# Patient Record
Sex: MALE | Race: WHITE | NOT HISPANIC OR LATINO | URBAN - METROPOLITAN AREA
[De-identification: names, ages, dates, MRNs, and addresses within clinical notes are randomized per-mention and may not be internally consistent; named-entity substitution may affect disease eponyms.]

---

## 2017-06-06 ENCOUNTER — INPATIENT (INPATIENT)
Facility: HOSPITAL | Age: 52
LOS: 2 days | Discharge: HOME | End: 2017-06-09
Attending: INTERNAL MEDICINE

## 2017-06-06 DIAGNOSIS — E03.9 HYPOTHYROIDISM, UNSPECIFIED: ICD-10-CM

## 2017-06-06 DIAGNOSIS — E78.5 HYPERLIPIDEMIA, UNSPECIFIED: ICD-10-CM

## 2017-06-06 DIAGNOSIS — D72.829 ELEVATED WHITE BLOOD CELL COUNT, UNSPECIFIED: ICD-10-CM

## 2017-06-06 DIAGNOSIS — F10.10 ALCOHOL ABUSE, UNCOMPLICATED: ICD-10-CM

## 2017-06-06 DIAGNOSIS — I10 ESSENTIAL (PRIMARY) HYPERTENSION: ICD-10-CM
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
## 2017-06-28 DIAGNOSIS — I95.9 HYPOTENSION, UNSPECIFIED: ICD-10-CM

## 2017-06-28 DIAGNOSIS — F10.10 ALCOHOL ABUSE, UNCOMPLICATED: ICD-10-CM

## 2017-06-28 DIAGNOSIS — E03.9 HYPOTHYROIDISM, UNSPECIFIED: ICD-10-CM

## 2017-06-28 DIAGNOSIS — Z88.0 ALLERGY STATUS TO PENICILLIN: ICD-10-CM

## 2017-06-28 DIAGNOSIS — E83.42 HYPOMAGNESEMIA: ICD-10-CM
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
## 2017-06-28 DIAGNOSIS — E78.5 HYPERLIPIDEMIA, UNSPECIFIED: ICD-10-CM

## 2017-06-28 DIAGNOSIS — F10.20 ALCOHOL DEPENDENCE, UNCOMPLICATED: ICD-10-CM

## 2017-06-28 DIAGNOSIS — G47.00 INSOMNIA, UNSPECIFIED: ICD-10-CM

## 2017-06-29 ENCOUNTER — TRANSCRIPTION ENCOUNTER (OUTPATIENT)
Age: 52
End: 2017-06-29

## 2017-06-30 PROBLEM — Z00.00 ENCOUNTER FOR PREVENTIVE HEALTH EXAMINATION: Status: ACTIVE | Noted: 2017-06-30

## 2017-07-03 ENCOUNTER — APPOINTMENT (OUTPATIENT)
Dept: UROLOGY | Facility: CLINIC | Age: 52
End: 2017-07-03

## 2017-07-03 VITALS
HEART RATE: 78 BPM | DIASTOLIC BLOOD PRESSURE: 75 MMHG | BODY MASS INDEX: 29.55 KG/M2 | HEIGHT: 68 IN | SYSTOLIC BLOOD PRESSURE: 113 MMHG | WEIGHT: 195 LBS

## 2017-07-03 DIAGNOSIS — Z87.442 PERSONAL HISTORY OF URINARY CALCULI: ICD-10-CM

## 2017-07-03 DIAGNOSIS — N20.0 CALCULUS OF KIDNEY: ICD-10-CM

## 2017-07-03 DIAGNOSIS — N13.8 BENIGN PROSTATIC HYPERPLASIA WITH LOWER URINARY TRACT SYMPMS: ICD-10-CM

## 2017-07-03 DIAGNOSIS — Z80.3 FAMILY HISTORY OF MALIGNANT NEOPLASM OF BREAST: ICD-10-CM

## 2017-07-03 DIAGNOSIS — Z82.0 FAMILY HISTORY OF EPILEPSY AND OTHER DISEASES OF THE NERVOUS SYSTEM: ICD-10-CM

## 2017-07-03 DIAGNOSIS — Z86.39 PERSONAL HISTORY OF OTHER ENDOCRINE, NUTRITIONAL AND METABOLIC DISEASE: ICD-10-CM

## 2017-07-03 DIAGNOSIS — Z78.9 OTHER SPECIFIED HEALTH STATUS: ICD-10-CM

## 2017-07-03 DIAGNOSIS — N40.1 BENIGN PROSTATIC HYPERPLASIA WITH LOWER URINARY TRACT SYMPMS: ICD-10-CM

## 2017-07-03 DIAGNOSIS — Z80.42 FAMILY HISTORY OF MALIGNANT NEOPLASM OF PROSTATE: ICD-10-CM

## 2017-07-03 DIAGNOSIS — Z86.79 PERSONAL HISTORY OF OTHER DISEASES OF THE CIRCULATORY SYSTEM: ICD-10-CM

## 2017-07-03 RX ORDER — TAMSULOSIN HYDROCHLORIDE 0.4 MG/1
0.4 CAPSULE ORAL
Qty: 90 | Refills: 3 | Status: ACTIVE | COMMUNITY
Start: 2017-07-03 | End: 1900-01-01

## 2017-07-03 RX ORDER — SIMVASTATIN 20 MG/1
20 TABLET, FILM COATED ORAL
Refills: 0 | Status: ACTIVE | COMMUNITY

## 2017-07-03 RX ORDER — NADOLOL 40 MG/1
40 TABLET ORAL
Refills: 0 | Status: ACTIVE | COMMUNITY

## 2017-07-03 RX ORDER — LEVOTHYROXINE SODIUM 125 UG/1
125 TABLET ORAL
Refills: 0 | Status: ACTIVE | COMMUNITY

## 2017-07-03 RX ORDER — POTASSIUM CITRATE 10 MEQ/1
10 MEQ TABLET, EXTENDED RELEASE ORAL 3 TIMES DAILY
Qty: 180 | Refills: 3 | Status: ACTIVE | COMMUNITY
Start: 2017-07-03 | End: 1900-01-01

## 2017-07-03 RX ORDER — POTASSIUM CITRATE 10 MEQ/1
10 MEQ TABLET, EXTENDED RELEASE ORAL
Refills: 0 | Status: ACTIVE | COMMUNITY

## 2017-07-05 ENCOUNTER — OUTPATIENT (OUTPATIENT)
Dept: OUTPATIENT SERVICES | Facility: HOSPITAL | Age: 52
LOS: 1 days | Discharge: HOME | End: 2017-07-05

## 2017-07-05 DIAGNOSIS — F10.10 ALCOHOL ABUSE, UNCOMPLICATED: ICD-10-CM

## 2017-07-05 DIAGNOSIS — E03.9 HYPOTHYROIDISM, UNSPECIFIED: ICD-10-CM

## 2017-07-05 DIAGNOSIS — D72.829 ELEVATED WHITE BLOOD CELL COUNT, UNSPECIFIED: ICD-10-CM

## 2017-07-05 DIAGNOSIS — E78.5 HYPERLIPIDEMIA, UNSPECIFIED: ICD-10-CM

## 2017-07-05 DIAGNOSIS — I10 ESSENTIAL (PRIMARY) HYPERTENSION: ICD-10-CM

## 2017-07-05 DIAGNOSIS — N40.1 BENIGN PROSTATIC HYPERPLASIA WITH LOWER URINARY TRACT SYMPTOMS: ICD-10-CM

## 2017-07-10 ENCOUNTER — INPATIENT (INPATIENT)
Facility: HOSPITAL | Age: 52
LOS: 2 days | Discharge: HOME | End: 2017-07-13
Attending: INTERNAL MEDICINE

## 2017-07-10 DIAGNOSIS — D72.829 ELEVATED WHITE BLOOD CELL COUNT, UNSPECIFIED: ICD-10-CM

## 2017-07-10 DIAGNOSIS — F10.10 ALCOHOL ABUSE, UNCOMPLICATED: ICD-10-CM

## 2017-07-10 DIAGNOSIS — I10 ESSENTIAL (PRIMARY) HYPERTENSION: ICD-10-CM

## 2017-07-10 DIAGNOSIS — E78.5 HYPERLIPIDEMIA, UNSPECIFIED: ICD-10-CM

## 2017-07-10 DIAGNOSIS — E03.9 HYPOTHYROIDISM, UNSPECIFIED: ICD-10-CM

## 2017-07-18 DIAGNOSIS — E03.9 HYPOTHYROIDISM, UNSPECIFIED: ICD-10-CM
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
## 2017-07-18 DIAGNOSIS — F31.9 BIPOLAR DISORDER, UNSPECIFIED: ICD-10-CM

## 2017-07-18 DIAGNOSIS — S00.81XA ABRASION OF OTHER PART OF HEAD, INITIAL ENCOUNTER: ICD-10-CM
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
## 2017-07-18 DIAGNOSIS — I10 ESSENTIAL (PRIMARY) HYPERTENSION: ICD-10-CM

## 2017-07-18 DIAGNOSIS — F10.129 ALCOHOL ABUSE WITH INTOXICATION, UNSPECIFIED: ICD-10-CM

## 2017-07-18 DIAGNOSIS — F20.9 SCHIZOPHRENIA, UNSPECIFIED: ICD-10-CM

## 2017-07-18 DIAGNOSIS — F10.10 ALCOHOL ABUSE, UNCOMPLICATED: ICD-10-CM
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
## 2017-07-18 DIAGNOSIS — G47.33 OBSTRUCTIVE SLEEP APNEA (ADULT) (PEDIATRIC): ICD-10-CM

## 2017-07-26 ENCOUNTER — APPOINTMENT (OUTPATIENT)
Dept: UROLOGY | Facility: CLINIC | Age: 52
End: 2017-07-26

## 2017-08-27 ENCOUNTER — INPATIENT (INPATIENT)
Facility: HOSPITAL | Age: 52
LOS: 1 days | Discharge: ANOTHER HOSPITAL | End: 2017-08-29
Attending: INTERNAL MEDICINE

## 2017-08-27 DIAGNOSIS — F10.10 ALCOHOL ABUSE, UNCOMPLICATED: ICD-10-CM

## 2017-08-27 DIAGNOSIS — E78.5 HYPERLIPIDEMIA, UNSPECIFIED: ICD-10-CM

## 2017-08-27 DIAGNOSIS — E03.9 HYPOTHYROIDISM, UNSPECIFIED: ICD-10-CM

## 2017-08-27 DIAGNOSIS — I10 ESSENTIAL (PRIMARY) HYPERTENSION: ICD-10-CM

## 2017-08-27 DIAGNOSIS — D72.829 ELEVATED WHITE BLOOD CELL COUNT, UNSPECIFIED: ICD-10-CM

## 2017-08-29 ENCOUNTER — INPATIENT (INPATIENT)
Facility: HOSPITAL | Age: 52
LOS: 2 days | Discharge: HOME | End: 2017-09-01
Attending: INTERNAL MEDICINE

## 2017-08-29 DIAGNOSIS — D72.829 ELEVATED WHITE BLOOD CELL COUNT, UNSPECIFIED: ICD-10-CM

## 2017-08-29 DIAGNOSIS — I10 ESSENTIAL (PRIMARY) HYPERTENSION: ICD-10-CM

## 2017-08-29 DIAGNOSIS — F10.10 ALCOHOL ABUSE, UNCOMPLICATED: ICD-10-CM

## 2017-08-29 DIAGNOSIS — E03.9 HYPOTHYROIDISM, UNSPECIFIED: ICD-10-CM

## 2017-08-29 DIAGNOSIS — E78.5 HYPERLIPIDEMIA, UNSPECIFIED: ICD-10-CM

## 2017-09-02 DIAGNOSIS — E78.00 PURE HYPERCHOLESTEROLEMIA, UNSPECIFIED: ICD-10-CM

## 2017-09-02 DIAGNOSIS — F10.20 ALCOHOL DEPENDENCE, UNCOMPLICATED: ICD-10-CM

## 2017-09-02 DIAGNOSIS — F10.231 ALCOHOL DEPENDENCE WITH WITHDRAWAL DELIRIUM: ICD-10-CM

## 2017-09-02 DIAGNOSIS — F31.9 BIPOLAR DISORDER, UNSPECIFIED: ICD-10-CM

## 2017-09-02 DIAGNOSIS — F14.10 COCAINE ABUSE, UNCOMPLICATED: ICD-10-CM

## 2017-09-02 DIAGNOSIS — Z88.8 ALLERGY STATUS TO OTHER DRUGS, MEDICAMENTS AND BIOLOGICAL SUBSTANCES: ICD-10-CM

## 2017-09-02 DIAGNOSIS — E03.9 HYPOTHYROIDISM, UNSPECIFIED: ICD-10-CM

## 2017-09-07 DIAGNOSIS — F10.231 ALCOHOL DEPENDENCE WITH WITHDRAWAL DELIRIUM: ICD-10-CM

## 2017-09-07 DIAGNOSIS — F20.9 SCHIZOPHRENIA, UNSPECIFIED: ICD-10-CM

## 2017-09-07 DIAGNOSIS — F10.251 ALCOHOL DEPENDENCE WITH ALCOHOL-INDUCED PSYCHOTIC DISORDER WITH HALLUCINATIONS: ICD-10-CM

## 2017-09-07 DIAGNOSIS — Z78.1 PHYSICAL RESTRAINT STATUS: ICD-10-CM

## 2017-09-07 DIAGNOSIS — D72.829 ELEVATED WHITE BLOOD CELL COUNT, UNSPECIFIED: ICD-10-CM

## 2017-09-07 DIAGNOSIS — R74.0 NONSPECIFIC ELEVATION OF LEVELS OF TRANSAMINASE AND LACTIC ACID DEHYDROGENASE [LDH]: ICD-10-CM

## 2017-09-07 DIAGNOSIS — E83.42 HYPOMAGNESEMIA: ICD-10-CM

## 2017-09-07 DIAGNOSIS — E78.5 HYPERLIPIDEMIA, UNSPECIFIED: ICD-10-CM

## 2017-09-07 DIAGNOSIS — F31.9 BIPOLAR DISORDER, UNSPECIFIED: ICD-10-CM

## 2017-09-07 DIAGNOSIS — E87.6 HYPOKALEMIA: ICD-10-CM

## 2017-09-07 DIAGNOSIS — G47.33 OBSTRUCTIVE SLEEP APNEA (ADULT) (PEDIATRIC): ICD-10-CM

## 2017-09-07 DIAGNOSIS — E03.9 HYPOTHYROIDISM, UNSPECIFIED: ICD-10-CM

## 2017-09-07 DIAGNOSIS — N40.0 BENIGN PROSTATIC HYPERPLASIA WITHOUT LOWER URINARY TRACT SYMPTOMS: ICD-10-CM

## 2017-09-07 DIAGNOSIS — R45.1 RESTLESSNESS AND AGITATION: ICD-10-CM

## 2017-09-07 DIAGNOSIS — I10 ESSENTIAL (PRIMARY) HYPERTENSION: ICD-10-CM

## 2017-09-21 ENCOUNTER — INPATIENT (INPATIENT)
Facility: HOSPITAL | Age: 52
LOS: 1 days | Discharge: HOME | End: 2017-09-23
Attending: INTERNAL MEDICINE | Admitting: INTERNAL MEDICINE

## 2017-09-21 DIAGNOSIS — D72.829 ELEVATED WHITE BLOOD CELL COUNT, UNSPECIFIED: ICD-10-CM

## 2017-09-21 DIAGNOSIS — I10 ESSENTIAL (PRIMARY) HYPERTENSION: ICD-10-CM

## 2017-09-21 DIAGNOSIS — E03.9 HYPOTHYROIDISM, UNSPECIFIED: ICD-10-CM

## 2017-09-21 DIAGNOSIS — E78.5 HYPERLIPIDEMIA, UNSPECIFIED: ICD-10-CM

## 2017-09-21 DIAGNOSIS — F10.10 ALCOHOL ABUSE, UNCOMPLICATED: ICD-10-CM

## 2017-09-27 DIAGNOSIS — F31.9 BIPOLAR DISORDER, UNSPECIFIED: ICD-10-CM

## 2017-09-27 DIAGNOSIS — E87.2 ACIDOSIS: ICD-10-CM

## 2017-09-27 DIAGNOSIS — F20.9 SCHIZOPHRENIA, UNSPECIFIED: ICD-10-CM

## 2017-09-27 DIAGNOSIS — R10.9 UNSPECIFIED ABDOMINAL PAIN: ICD-10-CM

## 2017-09-27 DIAGNOSIS — E03.9 HYPOTHYROIDISM, UNSPECIFIED: ICD-10-CM

## 2017-09-27 DIAGNOSIS — I10 ESSENTIAL (PRIMARY) HYPERTENSION: ICD-10-CM

## 2017-09-27 DIAGNOSIS — R74.0 NONSPECIFIC ELEVATION OF LEVELS OF TRANSAMINASE AND LACTIC ACID DEHYDROGENASE [LDH]: ICD-10-CM

## 2017-09-27 DIAGNOSIS — E78.5 HYPERLIPIDEMIA, UNSPECIFIED: ICD-10-CM

## 2017-09-27 DIAGNOSIS — F10.129 ALCOHOL ABUSE WITH INTOXICATION, UNSPECIFIED: ICD-10-CM

## 2017-09-27 DIAGNOSIS — G40.909 EPILEPSY, UNSPECIFIED, NOT INTRACTABLE, WITHOUT STATUS EPILEPTICUS: ICD-10-CM

## 2017-12-22 ENCOUNTER — TRANSCRIPTION ENCOUNTER (OUTPATIENT)
Age: 52
End: 2017-12-22

## 2018-06-18 ENCOUNTER — EMERGENCY (EMERGENCY)
Facility: HOSPITAL | Age: 53
LOS: 0 days | Discharge: HOME | End: 2018-06-19
Attending: EMERGENCY MEDICINE | Admitting: EMERGENCY MEDICINE

## 2018-06-18 VITALS
RESPIRATION RATE: 18 BRPM | SYSTOLIC BLOOD PRESSURE: 161 MMHG | HEART RATE: 110 BPM | WEIGHT: 214.95 LBS | HEIGHT: 69 IN | OXYGEN SATURATION: 97 % | TEMPERATURE: 96 F | DIASTOLIC BLOOD PRESSURE: 89 MMHG

## 2018-06-18 DIAGNOSIS — F10.10 ALCOHOL ABUSE, UNCOMPLICATED: ICD-10-CM

## 2018-06-18 NOTE — ED ADULT NURSE NOTE - NSELOPED_ED_ALL_ED
Patient's chart was referred to charge nurse/supervisor for disposition of prescription and/or discharge instructions./Physician notified/Patient and/or family announced that they are leaving. They were advised to stay, advised to return if worse.

## 2018-06-19 NOTE — ED PROVIDER NOTE - PHYSICAL EXAMINATION
Physical Exam    Vital Signs: I have reviewed the initial vital signs.  Constitutional: well-nourished, appears stated age, no acute distress + AOB  Eyes: Conjunctiva pink, Sclera clear, PERRLA, EOMI.  Cardiovascular: S1 and S2, regular rate, regular rhythm, well-perfused extremities, radial pulses equal and 2+  Respiratory: unlabored respiratory effort, clear to auscultation bilaterally no wheezing, rales and rhonchi  Gastrointestinal: soft, non-tender abdomen, no pulsatile mass, normal bowl sounds  Musculoskeletal: supple neck, no lower extremity edema, no midline tenderness  Integumentary: warm, dry, no rash  Neurologic: awake, alert, cranial nerves II-XII grossly intact, extremities’ motor and sensory functions grossly intact  Psychiatric: appropriate mood, appropriate affect

## 2018-06-19 NOTE — ED PROVIDER NOTE - OBJECTIVE STATEMENT
Pt presents today for ETOH. Pt history of prior detox. pt has no current complaints. Pt requesting food and blanket. Pt denies chest pain, shortness of breath, headache, dizziness, nausea/vomiting/diarrhea, head injury, recent trauma. Denies current thoughts of Suicidal and Homicidal Ideations.

## 2018-07-22 ENCOUNTER — TRANSCRIPTION ENCOUNTER (OUTPATIENT)
Age: 53
End: 2018-07-22

## 2020-06-02 NOTE — ED ADULT TRIAGE NOTE - PRO INTERPRETER NEED 2
+patch of cauterized mucosa visible without any superficial vessels seen in right nare, no active bleeding at this time. Mouth with normal mucosa  Throat has no vesicles, no oropharyngeal exudates and uvula is midline. English

## 2020-11-16 ENCOUNTER — INPATIENT (INPATIENT)
Facility: HOSPITAL | Age: 55
LOS: 1 days | Discharge: HOME | End: 2020-11-18
Attending: HOSPITALIST | Admitting: HOSPITALIST
Payer: MEDICAID

## 2020-11-16 VITALS
RESPIRATION RATE: 18 BRPM | HEART RATE: 78 BPM | TEMPERATURE: 98 F | DIASTOLIC BLOOD PRESSURE: 92 MMHG | HEIGHT: 69 IN | OXYGEN SATURATION: 98 % | SYSTOLIC BLOOD PRESSURE: 172 MMHG

## 2020-11-16 PROBLEM — F19.90 OTHER PSYCHOACTIVE SUBSTANCE USE, UNSPECIFIED, UNCOMPLICATED: Chronic | Status: ACTIVE | Noted: 2018-06-18

## 2020-11-16 PROBLEM — F10.10 ALCOHOL ABUSE, UNCOMPLICATED: Chronic | Status: ACTIVE | Noted: 2018-06-18

## 2020-11-16 LAB
ALBUMIN SERPL ELPH-MCNC: 4.4 G/DL — SIGNIFICANT CHANGE UP (ref 3.5–5.2)
ALP SERPL-CCNC: 87 U/L — SIGNIFICANT CHANGE UP (ref 30–115)
ALT FLD-CCNC: 40 U/L — SIGNIFICANT CHANGE UP (ref 0–41)
ANION GAP SERPL CALC-SCNC: 10 MMOL/L — SIGNIFICANT CHANGE UP (ref 7–14)
AST SERPL-CCNC: 29 U/L — SIGNIFICANT CHANGE UP (ref 0–41)
BASOPHILS # BLD AUTO: 0.03 K/UL — SIGNIFICANT CHANGE UP (ref 0–0.2)
BASOPHILS NFR BLD AUTO: 0.3 % — SIGNIFICANT CHANGE UP (ref 0–1)
BILIRUB SERPL-MCNC: 0.6 MG/DL — SIGNIFICANT CHANGE UP (ref 0.2–1.2)
BUN SERPL-MCNC: 14 MG/DL — SIGNIFICANT CHANGE UP (ref 10–20)
CALCIUM SERPL-MCNC: 9.7 MG/DL — SIGNIFICANT CHANGE UP (ref 8.5–10.1)
CHLORIDE SERPL-SCNC: 100 MMOL/L — SIGNIFICANT CHANGE UP (ref 98–110)
CO2 SERPL-SCNC: 27 MMOL/L — SIGNIFICANT CHANGE UP (ref 17–32)
CREAT SERPL-MCNC: 0.8 MG/DL — SIGNIFICANT CHANGE UP (ref 0.7–1.5)
EOSINOPHIL # BLD AUTO: 0.07 K/UL — SIGNIFICANT CHANGE UP (ref 0–0.7)
EOSINOPHIL NFR BLD AUTO: 0.6 % — SIGNIFICANT CHANGE UP (ref 0–8)
GLUCOSE SERPL-MCNC: 99 MG/DL — SIGNIFICANT CHANGE UP (ref 70–99)
HCT VFR BLD CALC: 43 % — SIGNIFICANT CHANGE UP (ref 42–52)
HGB BLD-MCNC: 14.1 G/DL — SIGNIFICANT CHANGE UP (ref 14–18)
IMM GRANULOCYTES NFR BLD AUTO: 0.3 % — SIGNIFICANT CHANGE UP (ref 0.1–0.3)
LACTATE SERPL-SCNC: 1.1 MMOL/L — SIGNIFICANT CHANGE UP (ref 0.7–2)
LYMPHOCYTES # BLD AUTO: 1.11 K/UL — LOW (ref 1.2–3.4)
LYMPHOCYTES # BLD AUTO: 9.7 % — LOW (ref 20.5–51.1)
MCHC RBC-ENTMCNC: 27.9 PG — SIGNIFICANT CHANGE UP (ref 27–31)
MCHC RBC-ENTMCNC: 32.8 G/DL — SIGNIFICANT CHANGE UP (ref 32–37)
MCV RBC AUTO: 85.1 FL — SIGNIFICANT CHANGE UP (ref 80–94)
MONOCYTES # BLD AUTO: 0.83 K/UL — HIGH (ref 0.1–0.6)
MONOCYTES NFR BLD AUTO: 7.2 % — SIGNIFICANT CHANGE UP (ref 1.7–9.3)
NEUTROPHILS # BLD AUTO: 9.41 K/UL — HIGH (ref 1.4–6.5)
NEUTROPHILS NFR BLD AUTO: 81.9 % — HIGH (ref 42.2–75.2)
NRBC # BLD: 0 /100 WBCS — SIGNIFICANT CHANGE UP (ref 0–0)
PLATELET # BLD AUTO: 128 K/UL — LOW (ref 130–400)
POTASSIUM SERPL-MCNC: 4.2 MMOL/L — SIGNIFICANT CHANGE UP (ref 3.5–5)
POTASSIUM SERPL-SCNC: 4.2 MMOL/L — SIGNIFICANT CHANGE UP (ref 3.5–5)
PROT SERPL-MCNC: 7 G/DL — SIGNIFICANT CHANGE UP (ref 6–8)
RBC # BLD: 5.05 M/UL — SIGNIFICANT CHANGE UP (ref 4.7–6.1)
RBC # FLD: 13.5 % — SIGNIFICANT CHANGE UP (ref 11.5–14.5)
SARS-COV-2 RNA SPEC QL NAA+PROBE: SIGNIFICANT CHANGE UP
SODIUM SERPL-SCNC: 137 MMOL/L — SIGNIFICANT CHANGE UP (ref 135–146)
WBC # BLD: 11.49 K/UL — HIGH (ref 4.8–10.8)
WBC # FLD AUTO: 11.49 K/UL — HIGH (ref 4.8–10.8)

## 2020-11-16 PROCEDURE — 99285 EMERGENCY DEPT VISIT HI MDM: CPT

## 2020-11-16 PROCEDURE — 73090 X-RAY EXAM OF FOREARM: CPT | Mod: 26,RT

## 2020-11-16 PROCEDURE — 99223 1ST HOSP IP/OBS HIGH 75: CPT | Mod: AI

## 2020-11-16 PROCEDURE — 93970 EXTREMITY STUDY: CPT | Mod: 26

## 2020-11-16 PROCEDURE — 73130 X-RAY EXAM OF HAND: CPT | Mod: 26,RT

## 2020-11-16 RX ORDER — VANCOMYCIN HCL 1 G
1000 VIAL (EA) INTRAVENOUS ONCE
Refills: 0 | Status: COMPLETED | OUTPATIENT
Start: 2020-11-16 | End: 2020-11-16

## 2020-11-16 RX ORDER — VANCOMYCIN HCL 1 G
1000 VIAL (EA) INTRAVENOUS EVERY 12 HOURS
Refills: 0 | Status: DISCONTINUED | OUTPATIENT
Start: 2020-11-16 | End: 2020-11-17

## 2020-11-16 RX ORDER — PANTOPRAZOLE SODIUM 20 MG/1
40 TABLET, DELAYED RELEASE ORAL
Refills: 0 | Status: DISCONTINUED | OUTPATIENT
Start: 2020-11-16 | End: 2020-11-18

## 2020-11-16 RX ORDER — SODIUM CHLORIDE 9 MG/ML
1000 INJECTION, SOLUTION INTRAVENOUS ONCE
Refills: 0 | Status: COMPLETED | OUTPATIENT
Start: 2020-11-16 | End: 2020-11-16

## 2020-11-16 RX ORDER — HEPARIN SODIUM 5000 [USP'U]/ML
5000 INJECTION INTRAVENOUS; SUBCUTANEOUS EVERY 8 HOURS
Refills: 0 | Status: DISCONTINUED | OUTPATIENT
Start: 2020-11-16 | End: 2020-11-18

## 2020-11-16 RX ADMIN — Medication 250 MILLIGRAM(S): at 19:05

## 2020-11-16 RX ADMIN — SODIUM CHLORIDE 1000 MILLILITER(S): 9 INJECTION, SOLUTION INTRAVENOUS at 18:46

## 2020-11-16 NOTE — H&P ADULT - NSHPLABSRESULTS_GEN_ALL_CORE
Labs:                         14.1   11.49 )-----------( 128      ( 16 Nov 2020 17:15 )             43.0     Neutophil% 81.9, Lymphocyte% 9.7, Monocyte% 7.2, Bands% 0.3 11-16-20 @ 17:15    16 Nov 2020 17:15    137    |  100    |  14     ----------------------------<  99     4.2     |  27     |  0.8      Ca    9.7        16 Nov 2020 17:15    TPro  7.0    /  Alb  4.4    /  TBili  0.6    /  DBili  x      /  AST  29     /  ALT  40     /  AlkPhos  87     16 Nov 2020 17:15                                            Radiology:

## 2020-11-16 NOTE — H&P ADULT - ASSESSMENT
53 yo M w/ PMHx of HTN, HLD, Hypothyroidism, BEKAH, IVDU and EtOH Abuse (takes no meds due to lack of insurance) who came to the ED for Pain, redness and swelling of his Rt Hand and forearm x 4 days. Admitted to medicine for cellulitis and possible compartment syndrome.    #Non-purulent cellulitis in setting of IVDU  -Shoots meth primarily in affected extremity  -RUE obviously swollen w/ overlying erythema and tenderness  -Duplex negative per tech  -Exam findings concerning for possible compartment syndrome  Plan  - f/u official Duplex and Xray read  - f/u vascular surgery  - c/w Vanco to cover MRSA  - Blood Cx, Procal  - ID consult    #Hx of EtOH use  -SBIRT  -CIWA    #HTN  -Elevated on admission  -Does not take any meds  Plan  - Trend BP  - Consider adding Lisinopril if remains elevated    #HLD  -Lipid profile  -Statin if elevated    #Hypothyroidism  -f/u Thyroid panel     DVT ppx Heparin Subcu  GI ppx PPI  Diet DASH  Code Full  Dispo Acute  Pending ID and Vascular eval, Abx

## 2020-11-16 NOTE — ED ADULT NURSE NOTE - NSSUHOSCREENINGYN_ED_ALL_ED
Burn Cardiology Critical Care ENT Electrophysiology Infectious Disease Intervent Radiology Neurology Nutrition Support Physiatry Pulmonology Surgery Gastroenterology Palliative Care Yes - the patient is able to be screened

## 2020-11-16 NOTE — ED PROVIDER NOTE - PROGRESS NOTE DETAILS
PALEVY: prelim venous duplex per vascular tech negative for DVT. there is +fb in subcutaneous tissue of one of digits, no open wounds, this is likely old. PALEVY: prelim venous duplex per vascular tech negative for DVT. on XR read by me there is +fb in subcutaneous tissue of one of digits, no open wounds, this is likely old.

## 2020-11-16 NOTE — H&P ADULT - NSHPPHYSICALEXAM_GEN_ALL_CORE
CONSTITUTIONAL: No acute distress, well-developed, well-groomed, AAOx3  HEAD: Atraumatic, normocephalic  EYES: EOM intact, PERRLA, conjunctiva and sclera clear  ENT: Supple, no masses, no thyromegaly, no bruits, no JVD; moist mucous membranes  PULMONARY: Clear to auscultation bilaterally; no wheezes, rales, or rhonchi  CARDIOVASCULAR: Regular rate and rhythm; no murmurs, rubs, or gallops  GASTROINTESTINAL: Soft, non-tender, non-distended; bowel sounds present  MUSCULOSKELETAL: Rt Arm and Forearm edematous, erythematous and painful, dec capillary refill  NEUROLOGY: Paresthesia in fingers  SKIN: Track marks b/l

## 2020-11-16 NOTE — ED PROVIDER NOTE - PHYSICAL EXAMINATION
PHYSICAL EXAM:    GENERAL: Alert, appears stated age, well appearing, non-toxic  SKIN: Warm, pink and dry. MMM. see MSK.   HEAD: NC  EYE: Normal lids/conjunctiva  ENT: Normal hearing, patent oropharynx  NECK: +supple. No meningismus, or JVD  Pulm: Bilateral BS, normal resp effort, no wheezes, stridor, or retractions  CV: RRR, no M/R/G, 2+and = radial pulses  Abd: soft, non-tender, non-distended  Mskel: R hand edema into R forearm. +ecchymosis at R wrist with streaking up to R AC. no pus. no focal collection. no open wounds.   Neuro: AAOx3, no sensory/motor deficits. 5/5 strength throughout. normal gait.

## 2020-11-16 NOTE — ED PROVIDER NOTE - CLINICAL SUMMARY MEDICAL DECISION MAKING FREE TEXT BOX
evaluated for cellulitis of the right forearm with history of IVDU and recent injection. will reqiure iv abx given IVD injection.. patient evaluated

## 2020-11-16 NOTE — H&P ADULT - NSHPSOCIALHISTORY_GEN_ALL_CORE
IVDU > Last used IV meth 1 week ago and took oral meth this morning  EtOH> says he has cut down to social drinking and had 1 drink 2 days ago.

## 2020-11-16 NOTE — ED ADULT NURSE NOTE - OBJECTIVE STATEMENT
patient present to ED c/o right arm pain and swelling since 11/2/2020, patient unsure of cause. patient has bruising noted to left medial wrist.

## 2020-11-16 NOTE — H&P ADULT - HISTORY OF PRESENT ILLNESS
The patient is an 55 yo M w/ PMHx of HTN, HLD, Hypothyroidism, BEKAH, IVDU and EtOH Abuse (takes no meds due to lack of insurance) who came to the ED for Pain, redness and swelling of his Rt Hand and forearm x 4 days. Per patient, he usually uses his Rt arm for IVDU, however last used his Lt arm 1 weeks ago. The patient has been having worsening pain, erythema and edema of his Rt arm and forearm 9/10 in intensity, which prompted him to come to the ED.     ED course: Patient HTN on initial encounter. Labs significant for leukocytosis. Duplex negative for DVT on tech read. Xray showing no obvious abnl. Physical Exam concerning for reduced capillary refill and paresthesia, thus vascular consult placed for possible compartment syndrome.

## 2020-11-16 NOTE — H&P ADULT - ATTENDING COMMENTS
55 YO M with a PMH of HTN, HLD, Hypothyroidism, BEKAH, IVDU (meth), and EtOH Abuse who presents to the hospital with a c/o right wrist redness/swelling for the past x 4 days. Associated with pain. Denies any trauma/falls. Denies any fevers/chills, paresthesias, discoloration, CP, palpitations, or LE swelling. Of note, the pt last "shot up" in the RUE ~3 days ago. In the ED, XRs of RUE and doppler are negative (pending official read). Cultures drawn and pt started on IV ABXs (Vanco) and IVFs (LR).     Physical exam shows pt in NAD. VSS, afebrile, not hypoxic on RA. A&Ox3. Non-focal neuro exam. Muscle strength/sensation intact. CTA B/L with no W/C/R. RRR, no M/G/R. ABD is soft and non-tender, normoactive BSs. Right hand/wrist with circumferential swelling noted from distal aspect and ascending to mid forearm; + TTP, difficult to palpate pulses, there is a 2x2cm area of erythema over the lateral aspect, no drainage; no pain in the wrist/elbow the active/passive ROM, no pain with short arc motion; track marks noted in right antecubital fossa. Labs and radiology as above.     Right wrist and forearm non-purulent cellulitis in setting of active IVDA, rule out compartment syndrome; no sepsis on admission. Send ESR/CRP. A1c and lipids. FU blood cultures. IV ABXs (Vanco). FU official XR results. ID consult. FU official venous doppler. Vascular consulted.     HX of HTN, HLD, Hypothyroidism, BEKAH, IVDU (meth), and EtOH Abuse. Restart home meds. DVT PPX. Inform PCP of pt's admission to hospital. My note supersedes the residents note.

## 2020-11-16 NOTE — ED PROVIDER NOTE - OBJECTIVE STATEMENT
55 y/o M with PMH HTN, HLD, hypothyroidism, BEKAH, IVDA (last heroin 1 wk ago), cocaine use, daily ETOH use, presents with R hand and forearm constant mild throbbing pressure-like pain x 4 days. +radiates up hand to arm. +swelling. +redness streaking up arm.   +worse with movement/palpation, better with rest.   +this is near an injection site used previously, no known fb.   no fever/n/v.  PCP Scafuri

## 2020-11-16 NOTE — ED PROVIDER NOTE - NS ED ROS FT
Review of Systems    Constitutional: (-) fever   Cardiovascular: (-) chest pain, (-) syncope (-) palpitations  Respiratory: (-) cough, (-) shortness of breath  Gastrointestinal: (-) vomiting, (-) diarrhea  (-) abdominal pain  Genitourinary:  (-) dysuria   Musculoskeletal: (-) neck pain, (-) back pain, (-) leg pain  Integumentary: see hpi   Neurological: (-) headache, (-) confusion  Hematologic: (-) easy bruising   Psychiatric: (-) hallucinations  Allergic/Immunologic: (-) pruritus

## 2020-11-16 NOTE — ED PROVIDER NOTE - ATTENDING CONTRIBUTION TO CARE
right forearm pain after injection of meth with history of IVDU, no fevers, exam shows soft compartment of forearm, no pain with passive extension, cap refill nml, pulse nml and sensation nml, there is swelling to right forearm with warmth and mild erythema. Will treat for cellulitis, admit for IV abx.

## 2020-11-16 NOTE — ED ADULT NURSE NOTE - NS ED NURSE REPORT GIVEN DT
Quality 474: Zoster Vaccination Status: Shingrix vaccination previously received Quality 131: Pain Assessment And Follow-Up: Pain assessment using a standardized tool is documented as negative, no follow-up plan required Detail Level: Detailed Quality 265: Biopsy Follow-Up: Biopsy results reviewed, communicated, tracked, and documented Quality 130: Documentation Of Current Medications In The Medical Record: Current Medications Documented 16-Nov-2020 21:34

## 2020-11-17 LAB
A1C WITH ESTIMATED AVERAGE GLUCOSE RESULT: 6 % — HIGH (ref 4–5.6)
ANION GAP SERPL CALC-SCNC: 11 MMOL/L — SIGNIFICANT CHANGE UP (ref 7–14)
BUN SERPL-MCNC: 10 MG/DL — SIGNIFICANT CHANGE UP (ref 10–20)
CALCIUM SERPL-MCNC: 8.8 MG/DL — SIGNIFICANT CHANGE UP (ref 8.5–10.1)
CHLORIDE SERPL-SCNC: 100 MMOL/L — SIGNIFICANT CHANGE UP (ref 98–110)
CHOLEST SERPL-MCNC: 170 MG/DL — SIGNIFICANT CHANGE UP
CK SERPL-CCNC: 121 U/L — SIGNIFICANT CHANGE UP (ref 0–225)
CO2 SERPL-SCNC: 24 MMOL/L — SIGNIFICANT CHANGE UP (ref 17–32)
CREAT SERPL-MCNC: 0.8 MG/DL — SIGNIFICANT CHANGE UP (ref 0.7–1.5)
ESTIMATED AVERAGE GLUCOSE: 126 MG/DL — HIGH (ref 68–114)
GLUCOSE SERPL-MCNC: 146 MG/DL — HIGH (ref 70–99)
HCT VFR BLD CALC: 43.8 % — SIGNIFICANT CHANGE UP (ref 42–52)
HCV AB S/CO SERPL IA: 0.04 COI — SIGNIFICANT CHANGE UP
HCV AB SERPL-IMP: SIGNIFICANT CHANGE UP
HDLC SERPL-MCNC: 47 MG/DL — SIGNIFICANT CHANGE UP
HGB BLD-MCNC: 14.1 G/DL — SIGNIFICANT CHANGE UP (ref 14–18)
LACTATE SERPL-SCNC: 1.1 MMOL/L — SIGNIFICANT CHANGE UP (ref 0.7–2)
LIPID PNL WITH DIRECT LDL SERPL: 107 MG/DL — HIGH
MCHC RBC-ENTMCNC: 27.9 PG — SIGNIFICANT CHANGE UP (ref 27–31)
MCHC RBC-ENTMCNC: 32.2 G/DL — SIGNIFICANT CHANGE UP (ref 32–37)
MCV RBC AUTO: 86.6 FL — SIGNIFICANT CHANGE UP (ref 80–94)
MRSA PCR RESULT.: NEGATIVE — SIGNIFICANT CHANGE UP
NON HDL CHOLESTEROL: 123 MG/DL — SIGNIFICANT CHANGE UP
NRBC # BLD: 0 /100 WBCS — SIGNIFICANT CHANGE UP (ref 0–0)
PLATELET # BLD AUTO: 116 K/UL — LOW (ref 130–400)
POTASSIUM SERPL-MCNC: 3.7 MMOL/L — SIGNIFICANT CHANGE UP (ref 3.5–5)
POTASSIUM SERPL-SCNC: 3.7 MMOL/L — SIGNIFICANT CHANGE UP (ref 3.5–5)
PROCALCITONIN SERPL-MCNC: 0.18 NG/ML — HIGH (ref 0.02–0.1)
RBC # BLD: 5.06 M/UL — SIGNIFICANT CHANGE UP (ref 4.7–6.1)
RBC # FLD: 13.6 % — SIGNIFICANT CHANGE UP (ref 11.5–14.5)
SODIUM SERPL-SCNC: 135 MMOL/L — SIGNIFICANT CHANGE UP (ref 135–146)
TRIGL SERPL-MCNC: 116 MG/DL — SIGNIFICANT CHANGE UP
WBC # BLD: 10.69 K/UL — SIGNIFICANT CHANGE UP (ref 4.8–10.8)
WBC # FLD AUTO: 10.69 K/UL — SIGNIFICANT CHANGE UP (ref 4.8–10.8)

## 2020-11-17 PROCEDURE — 93010 ELECTROCARDIOGRAM REPORT: CPT

## 2020-11-17 PROCEDURE — 99221 1ST HOSP IP/OBS SF/LOW 40: CPT

## 2020-11-17 PROCEDURE — 99233 SBSQ HOSP IP/OBS HIGH 50: CPT

## 2020-11-17 RX ORDER — AMPICILLIN SODIUM AND SULBACTAM SODIUM 250; 125 MG/ML; MG/ML
INJECTION, POWDER, FOR SUSPENSION INTRAMUSCULAR; INTRAVENOUS
Refills: 0 | Status: DISCONTINUED | OUTPATIENT
Start: 2020-11-17 | End: 2020-11-17

## 2020-11-17 RX ORDER — LACTOBACILLUS ACIDOPHILUS 100MM CELL
1 CAPSULE ORAL DAILY
Refills: 0 | Status: DISCONTINUED | OUTPATIENT
Start: 2020-11-17 | End: 2020-11-18

## 2020-11-17 RX ORDER — CHLORHEXIDINE GLUCONATE 213 G/1000ML
1 SOLUTION TOPICAL
Refills: 0 | Status: DISCONTINUED | OUTPATIENT
Start: 2020-11-17 | End: 2020-11-18

## 2020-11-17 RX ORDER — FOLIC ACID 0.8 MG
1 TABLET ORAL DAILY
Refills: 0 | Status: DISCONTINUED | OUTPATIENT
Start: 2020-11-17 | End: 2020-11-18

## 2020-11-17 RX ORDER — AMLODIPINE BESYLATE 2.5 MG/1
5 TABLET ORAL DAILY
Refills: 0 | Status: DISCONTINUED | OUTPATIENT
Start: 2020-11-17 | End: 2020-11-18

## 2020-11-17 RX ORDER — AMPICILLIN SODIUM AND SULBACTAM SODIUM 250; 125 MG/ML; MG/ML
1.5 INJECTION, POWDER, FOR SUSPENSION INTRAMUSCULAR; INTRAVENOUS EVERY 6 HOURS
Refills: 0 | Status: DISCONTINUED | OUTPATIENT
Start: 2020-11-17 | End: 2020-11-17

## 2020-11-17 RX ORDER — AMPICILLIN SODIUM AND SULBACTAM SODIUM 250; 125 MG/ML; MG/ML
1.5 INJECTION, POWDER, FOR SUSPENSION INTRAMUSCULAR; INTRAVENOUS ONCE
Refills: 0 | Status: COMPLETED | OUTPATIENT
Start: 2020-11-17 | End: 2020-11-17

## 2020-11-17 RX ORDER — CEFTRIAXONE 500 MG/1
2000 INJECTION, POWDER, FOR SOLUTION INTRAMUSCULAR; INTRAVENOUS EVERY 24 HOURS
Refills: 0 | Status: DISCONTINUED | OUTPATIENT
Start: 2020-11-17 | End: 2020-11-18

## 2020-11-17 RX ORDER — THIAMINE MONONITRATE (VIT B1) 100 MG
100 TABLET ORAL DAILY
Refills: 0 | Status: DISCONTINUED | OUTPATIENT
Start: 2020-11-17 | End: 2020-11-18

## 2020-11-17 RX ADMIN — Medication 100 MILLIGRAM(S): at 14:54

## 2020-11-17 RX ADMIN — Medication 100 MILLIGRAM(S): at 21:21

## 2020-11-17 RX ADMIN — HEPARIN SODIUM 5000 UNIT(S): 5000 INJECTION INTRAVENOUS; SUBCUTANEOUS at 14:48

## 2020-11-17 RX ADMIN — Medication 250 MILLIGRAM(S): at 06:02

## 2020-11-17 RX ADMIN — CEFTRIAXONE 100 MILLIGRAM(S): 500 INJECTION, POWDER, FOR SOLUTION INTRAMUSCULAR; INTRAVENOUS at 14:48

## 2020-11-17 RX ADMIN — AMPICILLIN SODIUM AND SULBACTAM SODIUM 100 GRAM(S): 250; 125 INJECTION, POWDER, FOR SUSPENSION INTRAMUSCULAR; INTRAVENOUS at 11:37

## 2020-11-17 RX ADMIN — HEPARIN SODIUM 5000 UNIT(S): 5000 INJECTION INTRAVENOUS; SUBCUTANEOUS at 21:20

## 2020-11-17 RX ADMIN — Medication 100 MILLIGRAM(S): at 11:18

## 2020-11-17 RX ADMIN — Medication 1 TABLET(S): at 11:19

## 2020-11-17 RX ADMIN — HEPARIN SODIUM 5000 UNIT(S): 5000 INJECTION INTRAVENOUS; SUBCUTANEOUS at 06:02

## 2020-11-17 RX ADMIN — Medication 1 MILLIGRAM(S): at 11:19

## 2020-11-17 RX ADMIN — AMLODIPINE BESYLATE 5 MILLIGRAM(S): 2.5 TABLET ORAL at 11:19

## 2020-11-17 RX ADMIN — PANTOPRAZOLE SODIUM 40 MILLIGRAM(S): 20 TABLET, DELAYED RELEASE ORAL at 06:02

## 2020-11-17 NOTE — PROGRESS NOTE ADULT - ATTENDING COMMENTS
Pt was seen and examined at bedside independently, pt is c/o right wrist and arm swelling, pain and redness, feels slightly better today.   He was admitted with soft tissue cellulitis, treated with broad spectrum ABx, consulted by ID ( f/u recommendations).   Nasal swab was negative for MRSA.  He has a significant swelling on his right wrist with large area of erythema and discoloration, will consult plastic surgery ( has low clinical suspicion for compartment syndrome, pt was consulted by vascular Sx, recommendations noted)   ID recommended to test for HIV Ab/Ag & HCV Ab screening (pt has h/o drug use and Etoh abuse) , consult Addiction  medicine.   I agree with medical resident's findings, assessment and plan above with few corrections in my note.    #Progress Note Handoff  Pending (specify): c/w IV Abx as per ID, add Probiotics, test for HIV, consult plastic Sx and Addiction medicine   Family discussion: I spoke with pt and family member at the bedside   Disposition: Home_x __/SNF___/Other________/Unknown at this time________

## 2020-11-17 NOTE — CONSULT NOTE ADULT - ASSESSMENT
ASSESSMENT  53 yo M w/ PMHx of HTN, HLD, Hypothyroidism, BEKAH, IVDU and EtOH Abuse (takes no meds due to lack of insurance) who came to the ED for Pain, redness and swelling of his Rt Hand and forearm x 4 days. Denies injection recently into the R arm, last use L arm 1 week ago.    IMPRESSION  #  < from: Xray Hand 3 Views, Right (11.16.20 @ 17:02) >  A faint triangular-shaped radiopaque density measuring 3 mm seen at the radial volar aspect of the second finger, level of the proximal phalanx. Correlate clinically for foreign body.    #Sepsis ruled out on admission     Creatinine, Serum: 0.8 (11-16-20 @ 17:15)      RECOMMENDATIONS  This is an incomplete consult note. All recommendations to follow after interview and examination of the patient.   - HIV Ab/Ag & HCV Ab screening    If any questions, please call or send a message on [employer] Teams  Spectra [phone]   ASSESSMENT  53 yo M w/ PMHx of HTN, HLD, Hypothyroidism, BEKAH, IVDU and EtOH Abuse (takes no meds due to lack of insurance) who came to the ED for Pain, redness and swelling of his Rt Hand and forearm x 4 days. Denies injection recently into the R arm, last use L arm 1 week ago.    IMPRESSION  #  < from: Xray Hand 3 Views, Right (11.16.20 @ 17:02) >  A faint triangular-shaped radiopaque density measuring 3 mm seen at the radial volar aspect of the second finger, level of the proximal phalanx. Correlate clinically for foreign body.    #Sepsis ruled out on admission     Creatinine, Serum: 0.8 (11-16-20 @ 17:15)      RECOMMENDATIONS  This is an incomplete consult note. All recommendations to follow after interview and examination of the patient.   - Seen by Vascular: no evidence of compartment syndrome  - HIV Ab/Ag & HCV Ab screening    If any questions, please call or send a message on [employer] Teams  Spectra [phone]   ASSESSMENT  53 yo M w/ PMHx of HTN, HLD, Hypothyroidism, BEKAH, IVDU and EtOH Abuse (takes no meds due to lack of insurance) who came to the ED for Pain, redness and swelling of his Rt Hand and forearm x 4 days. Denies injection recently into the R arm, last use L arm 1 week ago.    IMPRESSION  #R wrist cellulitis/dermatitis in the setting of IVDU with meth   < from: Xray Hand 3 Views, Right (11.16.20 @ 17:02) >  A faint triangular-shaped radiopaque density measuring 3 mm seen at the radial volar aspect of the second finger, level of the proximal phalanx. Correlate clinically for foreign body.  #Sepsis ruled out on admission     Creatinine, Serum: 0.8 (11-16-20 @ 17:15)      RECOMMENDATIONS  - Clinda 600mg q8h IV and Ceftriaxone 2g q24h IV, d/c VANC  - If not improving, MRI R hand  - foreign body on CXR  - Seen by Vascular: no evidence of compartment syndrome  - HIV Ab/Ag & HCV Ab screening    If any questions, please call or send a message on [employer] Teams  Spectra [phone]

## 2020-11-17 NOTE — PROGRESS NOTE ADULT - ASSESSMENT
55 yo M w/ PMHx of HTN, HLD, Hypothyroidism, BEKAH, IVDU and EtOH Abuse (takes no meds due to lack of insurance) who came to the ED for Pain, redness and swelling of his Rt Hand and forearm x 4 days. Admitted to medicine for cellulitis of RUE.    #Non-purulent cellulitis in setting of hx of IVDU  - Uses IV meth primarily in affected extremity  - Stated he has not used IV drugs in over 2 weeks, per chart, previously stated it had been several days  - RUE duplex done, pending read  - Xray revealed soft tissue swelling  - CK wnl, WBC elevated, afebrile  - Vascular following, no evidence of compartment syndrome  - ID following, will obtain HIV/Hep panels    #Hx of EtOH use  - SBIRT  - CIWA protocol inplace  - No current evidence of withdrawal at this time    #HTN - elevated  - Cont to monitor  - May add BP med, although unlikely pt will take med upon discharge due to no insurance    #HLD  - Lipid profile wnl    #Hypothyroidism  - f/u Thyroid panel     DVT ppx: HSQ  GI ppx: PPI  Diet: DASH  Code Full  Dispo: Acute     53 yo M w/ PMHx of HTN, HLD, Hypothyroidism, BEKAH, IVDU and EtOH Abuse (takes no meds due to lack of insurance) who came to the ED for Pain, redness and swelling of his Rt Hand and forearm x 4 days. Admitted to medicine for cellulitis of RUE.    #Non-purulent cellulitis in setting of hx of IVDU  - Uses IV meth primarily in affected extremity  - Stated he has not used IV drugs in over 2 weeks, per chart, previously stated it had been several days  - RUE duplex done, pending read  - Xray revealed soft tissue swelling  - CK wnl, WBC elevated, afebrile  - Vascular following, no evidence of compartment syndrome  - ID following, will obtain HIV/Hep panels    #Hx of EtOH use  - SBIRT  - CIWA protocol inplace  - Will c/s Addiction    #HTN - elevated  - Will add Amlodipine 5 mg, cont to monitor    #HLD  - Lipid profile wnl  - HbA1c 6.0%    #Hypothyroidism  - f/u Thyroid panel     DVT ppx: HSQ  GI ppx: PPI  Diet: DASH  Code Full  Dispo: Acute     55 yo M w/ PMHx of HTN, HLD, Hypothyroidism, BEKAH, IVDU and EtOH Abuse (takes no meds due to lack of insurance) who came to the ED for Pain, redness and swelling of his Rt Hand and forearm x 4 days. Admitted to medicine for cellulitis of RUE.    #Non-purulent cellulitis in setting of hx of IVDU  - Uses IV meth primarily in affected extremity  - Stated he has not used IV drugs in over 2 weeks, per chart, previously stated it had been several days  - RUE duplex done, pending read  - Xray revealed soft tissue swelling  - CK wnl, WBC elevated, afebrile  - Vascular following, no evidence of compartment syndrome  - ID following, will obtain HIV/Hep panels    #Hx of EtOH use  - SBIRT  - CIWA protocol inplace  - Will c/s Addiction    #Chronic thrombocytopenia likely related to alcohol use  - Has been averaging about 100 for past 3 years  - Stable, ~115 here, cont to monitor  - F/u with PCP as outpt    #HTN - elevated  - Will add Amlodipine 5 mg, cont to monitor    #HLD  - Lipid profile wnl  - HbA1c 6.0%    #Hypothyroidism  - f/u Thyroid panel     DVT ppx: HSQ  GI ppx: PPI  Diet: DASH  Code Full  Dispo: Acute     53 yo M w/ PMHx of HTN, HLD, Hypothyroidism, BEKAH, IVDU and EtOH Abuse (takes no meds due to lack of insurance) who came to the ED for Pain, redness and swelling of his Rt Hand and forearm x 4 days. Admitted to medicine for cellulitis of RUE.    #Non-purulent cellulitis in setting of hx of IVDU  - Uses IV meth primarily in affected extremity  - Stated he has not used IV drugs in over 2 weeks, per chart, previously stated it had been several days  - RUE duplex done, pending read  - Xray revealed soft tissue swelling  - CK wnl, WBC elevated, afebrile  - Vascular following, no evidence of compartment syndrome  - ID following, will obtain HIV/Hep panels    #Hx of EtOH use  - SBIRT  - CIWA protocol inplace  - Will c/s Addiction    #Chronic thrombocytopenia likely related to alcohol use  - Has been averaging about 100 for past 3 years  - Stable, ~120 here, cont to monitor  - F/u with PCP as outpt    #HTN - elevated  - Will add Amlodipine 5 mg, cont to monitor    #HLD  - Lipid profile wnl  - HbA1c 6.0%    #Hypothyroidism  - f/u Thyroid panel     DVT ppx: HSQ  GI ppx: PPI  Diet: DASH  Code Full  Dispo: Acute

## 2020-11-17 NOTE — CONSULT NOTE ADULT - ASSESSMENT
ASSESSMENT: 53 yo M w/ PMHx of HTN, HLD, Hypothyroidism, BEKAH, IVDU and EtOH Abuse presents to ED c/o pain to the right hand and forearm for 4 days. patient states he injects meth to the arm regularly. He states the arm has been getting more swollen and painful for the last 4 days. On physical exam swollen and warm right hand with some erythema at the wrist. palpable radial, +cap refill, compartments soft    PLAN:   no evidence of compartment   keep arm elevated  Iv antibiotics  plastics consult    patient seen with and plan discussed with vascular fellow Dr Renteria

## 2020-11-17 NOTE — CONSULT NOTE ADULT - SUBJECTIVE AND OBJECTIVE BOX
HPI:  55 yo M w/ PMHx of HTN, HLD, Hypothyroidism, BEKAH, IVDU and EtOH Abuse presents to ED c/o pain to the right hand and forearm for 4 days. patient states he injects meth to the arm regularly. He states the arm has been getting more swollen and painful for the last 4 days which is why he decided to come to ED    PAST MEDICAL AND SURGICAL HISTORY:  PAST MEDICAL & SURGICAL HISTORY:  Drug use    Alcohol abuse    No significant past surgical history    FAMILY HISTORY:  No pertinent family history in first degree relatives    ALLERGIES: No Known Allergies    Home Medications:  denies    CURRENT MEDICATIONS  MEDICATIONS (STANDING): heparin   Injectable 5000 Unit(s) SubCutaneous every 8 hours  pantoprazole    Tablet 40 milliGRAM(s) Oral before breakfast  vancomycin  IVPB 1000 milliGRAM(s) IV Intermittent every 12 hours    MEDICATIONS (PRN):LORazepam     Tablet 2 milliGRAM(s) Oral every 2 hours PRN CIWA-Ar score increase by 2 points and a total score of 7 or less    --------------------------------------------------------------------------------------------    Vitals:   T(C): 36.6 (11-16-20 @ 15:52), Max: 36.6 (11-16-20 @ 15:52)  HR: 78 (11-16-20 @ 15:52) (78 - 78)  BP: 172/92 (11-16-20 @ 15:52) (172/92 - 172/92)  RR: 18 (11-16-20 @ 15:52) (18 - 18)  SpO2: 98% (11-16-20 @ 15:52) (98% - 98%)    Height (cm): 175.3 (11-16 @ 15:52)    PHYSICAL EXAM:  GENERAL: NAD,  EXTREMITIES: swollen and warm right hand with some erythema at the wrist. palpable radial pulses, +cap refill, compartments soft  PSYCH: AAOx3  --------------------------------------------------------------------------------------------    LABS  CBC (11-16 @ 17:15)                              14.1                           11.49<H>  )----------------(  128<L>     81.9<H>% Neutrophils, 9.7<L>% Lymphocytes, ANC: 9.41<H>                              43.0      BMP (11-16 @ 17:15)             137     |  100     |  14    		Ca++ --      Ca 9.7                ---------------------------------( 99    		Mg --                 4.2     |  27      |  0.8   			Ph --        LFTs (11-16 @ 17:15)      TPro 7.0 / Alb 4.4 / TBili 0.6 / DBili -- / AST 29 / ALT 40 / AlkPhos 87        ABG (11-16 @ 17:15)      /  /  /  /  / %     Lactate:  1.1    IMAGING:  none

## 2020-11-17 NOTE — PROGRESS NOTE ADULT - SUBJECTIVE AND OBJECTIVE BOX
Patient Information:  YENNY VILLEDA / 54y / Male / MRN#:[id number]    Hospital Day: 1d    Interval History:  Patient seen and examined at bedside. No acute events overnight.  Pt reports he feels well, has swelling in RUE, limited ROM. Is otherwise ambulating without issues, feels well.  Past Medical History:  Drug use    Alcohol abuse      Past Surgical History:  No significant past surgical history      Allergies:  No Known Allergies    Medications:  PRN:  LORazepam     Tablet 2 milliGRAM(s) Oral every 2 hours PRN CIWA-Ar score increase by 2 points and a total score of 7 or less    Standing:  heparin   Injectable 5000 Unit(s) SubCutaneous every 8 hours  pantoprazole    Tablet 40 milliGRAM(s) Oral before breakfast  vancomycin  IVPB 1000 milliGRAM(s) IV Intermittent every 12 hours    Home:    Vitals:  T(C): 37.4, Max: 37.6 (11-17-20 @ 01:00)  T(F): 99.4, Max: 99.7 (11-17-20 @ 01:00)  HR: 94 (78 - 99)  BP: 146/87 (145/84 - 172/92)  RR: 18 (18 - 18)  SpO2: 98% (94% - 98%)    Physical Exam:  General: Awake, alert, NAD, appears displeased, on RA  HEENT: Head NC/AT  Heart: inc rate, regular rhythm; S1/S2; no rubs, murmurs appreciated  Lungs: Clear to auscultation bilaterally, no wheezing, rales or rhonchi  Abdomen: Soft, nontender, nondistended, BS+  Extremities: Erythematous R medial wrist, edematous distal RUE, non pitting, limited ROM of R hand and fingers  Neuro: AAOx3, NFD    Labs:  CBC (11-16 @ 17:15)                        Hgb: 14.1   WBC: 11.49 )-----------------( Plts: 128                              Hct: 43.0     Chem (11-16 @ 17:15)  Na: 137  |     Cl: 100     |  BUN: 14  -----------------------------------------< Gluc: 99    K: 4.2   |    HCO3: 27  |  Cr: 0.8    Ca 9.7 (11-16 @ 17:15)    LFTs (11-16 @ 17:15)  TPro 7.0  /  Alb 4.4  TBili 0.6  /  DBili     AST 29  /  ALT 40  /  AlkPhos 87    Cardiac Markers (11-17 @ 05:39)  Troponin I X  Troponin T X    CKMB X  CKMB Units X  Myoglobin X  Lactate 1.1  ESR X    Cardiac Markers (11-16 @ 17:15)  Troponin I X  Troponin T X  CK X  CKMB X  CKMB Units X  Myoglobin X  Lactate 1.1  ESR X            Microbiology:    Radiology:

## 2020-11-17 NOTE — CONSULT NOTE ADULT - ASSESSMENT
55 yo M w/ PMHx of HTN, HLD, Hypothyroidism, BEKAH, IVDU and EtOH Abuse (takes no meds due to lack of insurance) admitted for cellulitis of right wrist extending to elbow    - clinically improving with IV antibiotics  - continue abx per ID recommendations   - trend WBC and fever   - no surgical intervention indicated at this time   - arm elevation   - discussed with Dr. Jain

## 2020-11-17 NOTE — CONSULT NOTE ADULT - SUBJECTIVE AND OBJECTIVE BOX
RHONDA VILLEDAH [id number]  54y Male    HPI:  The patient is an 55 yo M w/ PMHx of HTN, HLD, Hypothyroidism, BEKAH, IVDU and EtOH Abuse (takes no meds due to lack of insurance) who came to the ED for Pain, redness and swelling of his Rt Hand and forearm x 4 days. Per patient, he usually uses his Rt arm for IVDU, however last used his Lt arm 1 weeks ago. The patient has been having worsening pain, erythema and edema of his Rt arm and forearm 9/10 in intensity, which prompted him to come to the ED. He was started on IV abx and cellulitis has improved but he still complains of swelling and decreased range of motion of right wrist due to swelling. Denies fever/chills. No fluid collection.       PAST MEDICAL & SURGICAL HISTORY:  Drug use    Alcohol abuse    No significant past surgical history          MEDICATIONS  (STANDING):  amLODIPine   Tablet 5 milliGRAM(s) Oral daily  cefTRIAXone   IVPB 2000 milliGRAM(s) IV Intermittent every 24 hours  chlorhexidine 4% Liquid 1 Application(s) Topical <User Schedule>  clindamycin IVPB 600 milliGRAM(s) IV Intermittent every 8 hours  folic acid 1 milliGRAM(s) Oral daily  heparin   Injectable 5000 Unit(s) SubCutaneous every 8 hours  lactobacillus acidophilus 1 Tablet(s) Oral daily  pantoprazole    Tablet 40 milliGRAM(s) Oral before breakfast  thiamine 100 milliGRAM(s) Oral daily    MEDICATIONS  (PRN):  LORazepam     Tablet 2 milliGRAM(s) Oral every 2 hours PRN CIWA-Ar score increase by 2 points and a total score of 7 or less      Allergies    No Known Allergies    Intolerances        REVIEW OF SYSTEMS    [ x ] A ten-point review of systems was otherwise negative except as noted.  [ ] Due to altered mental status/intubation, subjective information were not able to be obtained from the patient. History was obtained, to the extent possible, from review of the chart and collateral sources of information.      Vital Signs Last 24 Hrs  T(C): 37.2 (17 Nov 2020 13:45), Max: 37.6 (17 Nov 2020 01:00)  T(F): 98.9 (17 Nov 2020 13:45), Max: 99.7 (17 Nov 2020 01:00)  HR: 93 (17 Nov 2020 13:45) (93 - 99)  BP: 146/82 (17 Nov 2020 13:45) (145/84 - 146/87)  BP(mean): --  RR: 19 (17 Nov 2020 13:45) (18 - 19)  SpO2: 98% (17 Nov 2020 07:39) (94% - 98%)    PHYSICAL EXAM:  GENERAL: NAD, well-appearing  CHEST/LUNG: Clear to auscultation bilaterally  HEART: Regular rate and rhythm  ABDOMEN: Soft, Nontender, Nondistended;   EXTREMITIES:  erythema of right medial wrist with swelling extending to right elbow, sensation/motor intact, decreased range of motion of right wrist secondary to swelling       LABS:  Labs:  CAPILLARY BLOOD GLUCOSE                              14.1   10.69 )-----------( 116      ( 17 Nov 2020 14:11 )             43.8         11-17    135  |  100  |  10  ----------------------------<  146<H>  3.7   |  24  |  0.8      Calcium, Total Serum: 8.8 mg/dL (11-17-20 @ 14:11)      LFTs:             7.0  | 0.6  | 29       ------------------[87      ( 16 Nov 2020 17:15 )  4.4  | x    | 40          Lipase:x      Amylase:x         Lactate, Blood: 1.1 mmol/L (11-17-20 @ 05:39)  Lactate, Blood: 1.1 mmol/L (11-16-20 @ 17:15)      Coags:    CARDIAC MARKERS ( 17 Nov 2020 05:39 )  x     / x     / 121 U/L / x     / x                      RADIOLOGY & ADDITIONAL STUDIES:  < from: Xray Hand 3 Views, Right (11.16.20 @ 17:02) >    No acute displaced fracture or dislocation.    Diffuse soft tissue swelling is noted.    Mild degenerative changes of the radiocarpal and intercarpal joints.    A faint triangular-shaped radiopaque density measuring 3 mm seen at the radial volar aspect of the second finger, level of the proximal phalanx. Correlate clinically for foreign body.    < end of copied text >

## 2020-11-18 ENCOUNTER — TRANSCRIPTION ENCOUNTER (OUTPATIENT)
Age: 55
End: 2020-11-18

## 2020-11-18 VITALS
TEMPERATURE: 99 F | DIASTOLIC BLOOD PRESSURE: 79 MMHG | HEART RATE: 94 BPM | RESPIRATION RATE: 20 BRPM | SYSTOLIC BLOOD PRESSURE: 139 MMHG

## 2020-11-18 LAB
ANION GAP SERPL CALC-SCNC: 12 MMOL/L — SIGNIFICANT CHANGE UP (ref 7–14)
BASOPHILS # BLD AUTO: 0.04 K/UL — SIGNIFICANT CHANGE UP (ref 0–0.2)
BASOPHILS NFR BLD AUTO: 0.3 % — SIGNIFICANT CHANGE UP (ref 0–1)
BUN SERPL-MCNC: 12 MG/DL — SIGNIFICANT CHANGE UP (ref 10–20)
CALCIUM SERPL-MCNC: 9.2 MG/DL — SIGNIFICANT CHANGE UP (ref 8.5–10.1)
CHLORIDE SERPL-SCNC: 99 MMOL/L — SIGNIFICANT CHANGE UP (ref 98–110)
CO2 SERPL-SCNC: 26 MMOL/L — SIGNIFICANT CHANGE UP (ref 17–32)
CREAT SERPL-MCNC: 0.8 MG/DL — SIGNIFICANT CHANGE UP (ref 0.7–1.5)
EOSINOPHIL # BLD AUTO: 0.09 K/UL — SIGNIFICANT CHANGE UP (ref 0–0.7)
EOSINOPHIL NFR BLD AUTO: 0.8 % — SIGNIFICANT CHANGE UP (ref 0–8)
GLUCOSE SERPL-MCNC: 136 MG/DL — HIGH (ref 70–99)
HCT VFR BLD CALC: 42.9 % — SIGNIFICANT CHANGE UP (ref 42–52)
HGB BLD-MCNC: 13.9 G/DL — LOW (ref 14–18)
HIV 1+2 AB+HIV1 P24 AG SERPL QL IA: SIGNIFICANT CHANGE UP
IMM GRANULOCYTES NFR BLD AUTO: 0.4 % — HIGH (ref 0.1–0.3)
LYMPHOCYTES # BLD AUTO: 1.83 K/UL — SIGNIFICANT CHANGE UP (ref 1.2–3.4)
LYMPHOCYTES # BLD AUTO: 15.9 % — LOW (ref 20.5–51.1)
MCHC RBC-ENTMCNC: 28 PG — SIGNIFICANT CHANGE UP (ref 27–31)
MCHC RBC-ENTMCNC: 32.4 G/DL — SIGNIFICANT CHANGE UP (ref 32–37)
MCV RBC AUTO: 86.5 FL — SIGNIFICANT CHANGE UP (ref 80–94)
MONOCYTES # BLD AUTO: 1.25 K/UL — HIGH (ref 0.1–0.6)
MONOCYTES NFR BLD AUTO: 10.9 % — HIGH (ref 1.7–9.3)
NEUTROPHILS # BLD AUTO: 8.24 K/UL — HIGH (ref 1.4–6.5)
NEUTROPHILS NFR BLD AUTO: 71.7 % — SIGNIFICANT CHANGE UP (ref 42.2–75.2)
NRBC # BLD: 0 /100 WBCS — SIGNIFICANT CHANGE UP (ref 0–0)
PLATELET # BLD AUTO: 130 K/UL — SIGNIFICANT CHANGE UP (ref 130–400)
POTASSIUM SERPL-MCNC: 4 MMOL/L — SIGNIFICANT CHANGE UP (ref 3.5–5)
POTASSIUM SERPL-SCNC: 4 MMOL/L — SIGNIFICANT CHANGE UP (ref 3.5–5)
RBC # BLD: 4.96 M/UL — SIGNIFICANT CHANGE UP (ref 4.7–6.1)
RBC # FLD: 13.5 % — SIGNIFICANT CHANGE UP (ref 11.5–14.5)
SODIUM SERPL-SCNC: 137 MMOL/L — SIGNIFICANT CHANGE UP (ref 135–146)
T3 SERPL-MCNC: 109 NG/DL — SIGNIFICANT CHANGE UP (ref 80–200)
T4 AB SER-ACNC: 5.9 UG/DL — SIGNIFICANT CHANGE UP (ref 4.6–12)
T4 FREE+ TSH PNL SERPL: 0.5 UIU/ML — SIGNIFICANT CHANGE UP (ref 0.27–4.2)
WBC # BLD: 11.5 K/UL — HIGH (ref 4.8–10.8)
WBC # FLD AUTO: 11.5 K/UL — HIGH (ref 4.8–10.8)

## 2020-11-18 PROCEDURE — 99239 HOSP IP/OBS DSCHRG MGMT >30: CPT

## 2020-11-18 RX ORDER — AMLODIPINE BESYLATE 2.5 MG/1
1 TABLET ORAL
Qty: 30 | Refills: 3
Start: 2020-11-18 | End: 2021-03-17

## 2020-11-18 RX ORDER — THIAMINE MONONITRATE (VIT B1) 100 MG
1 TABLET ORAL
Qty: 30 | Refills: 3
Start: 2020-11-18 | End: 2021-03-17

## 2020-11-18 RX ORDER — FOLIC ACID 0.8 MG
1 TABLET ORAL
Qty: 30 | Refills: 3
Start: 2020-11-18 | End: 2021-03-17

## 2020-11-18 RX ORDER — LACTOBACILLUS ACIDOPHILUS 100MM CELL
1 CAPSULE ORAL
Qty: 28 | Refills: 0
Start: 2020-11-18 | End: 2020-12-01

## 2020-11-18 RX ADMIN — Medication 450 MILLIGRAM(S): at 13:31

## 2020-11-18 RX ADMIN — Medication 1 TABLET(S): at 12:21

## 2020-11-18 RX ADMIN — Medication 100 MILLIGRAM(S): at 12:21

## 2020-11-18 RX ADMIN — PANTOPRAZOLE SODIUM 40 MILLIGRAM(S): 20 TABLET, DELAYED RELEASE ORAL at 05:39

## 2020-11-18 RX ADMIN — AMLODIPINE BESYLATE 5 MILLIGRAM(S): 2.5 TABLET ORAL at 05:39

## 2020-11-18 RX ADMIN — Medication 1 MILLIGRAM(S): at 12:21

## 2020-11-18 RX ADMIN — Medication 100 MILLIGRAM(S): at 05:39

## 2020-11-18 RX ADMIN — HEPARIN SODIUM 5000 UNIT(S): 5000 INJECTION INTRAVENOUS; SUBCUTANEOUS at 05:39

## 2020-11-18 NOTE — CHART NOTE - NSCHARTNOTEFT_GEN_A_CORE
<<<RESIDENT DISCHARGE NOTE>>>     YENNY VILLEDA  MRN-[id number]    VITAL SIGNS:  T(F): 99 (11-18-20 @ 05:15), Max: 99.6 (11-17-20 @ 21:17)  HR: 94 (11-18-20 @ 05:15)  BP: 139/79 (11-18-20 @ 05:15)  SpO2: 97% (11-17-20 @ 19:38)      PHYSICAL EXAMINATION:  General: NAD, resting comfortably in bed  Head & Neck: AT, NC  Pulmonary: CTAB  Cardiovascular: RRR, normal S1, S2  Gastrointestinal/Abdomen & Pelvis: soft, nontender, nondistended  Extremities: erythema of distal RUE, improved ROM, diffuse swelling of hand and wrist, nontender  Neurologic/Motor: AAOx3, NFD    TEST RESULTS:                        13.9   11.50 )-----------( 130      ( 18 Nov 2020 06:24 )             42.9       11-18    137  |  99  |  12  ----------------------------<  136<H>  4.0   |  26  |  0.8    Ca    9.2      18 Nov 2020 06:24    TPro  7.0  /  Alb  4.4  /  TBili  0.6  /  DBili  x   /  AST  29  /  ALT  40  /  AlkPhos  87  11-16      FINAL DISCHARGE INTERVIEW:  Resident(s) Present: Zaida Avilez    DISCHARGE MEDICATION RECONCILIATION  reviewed with Attending: Dr. Berry    DISPOSITION:   [ X ] Home,    [  ] Home with Visiting Nursing Services,   [    ]  SNF/ NH,    [   ] Acute Rehab (4A),   [   ] Other (Specify:_________)

## 2020-11-18 NOTE — DISCHARGE NOTE PROVIDER - NSDCCPCAREPLAN_GEN_ALL_CORE_FT
PRINCIPAL DISCHARGE DIAGNOSIS  Diagnosis: Cellulitis  Assessment and Plan of Treatment: Take the prescribed antibiotic medicine you are given as directed until it is gone. Take it even if you feel better. It treats the infection and stops it from  Not taking all the medicine can make future infections hard to treat. Keep the infected area clean. When possible, raise the infected area above the level of your heart. This helps keep swelling down. Apply clean bandages as advised. Wash your hands often to prevent spreading the infection. In the future, wash your hands before and after you touch cuts, scratches, or bandages. This will help prevent infection.   Call your doctor or returnt o the emergency room or call 911 immediately if you have any of the following: Difficulty or pain when moving the joints above or below the infected area, Discharge or pus draining from the area, rever of 100.4°F (38°C) or higher, or as directed by your healthcare provider, pain that gets worse in or around the infected site, redness that gets worse in or around the infected area, particularly if the area of redness expands to a wider area, shaking chills, swelling of the infected area, vomiting.  **Take Clindamycin 450 mg three times a day for 10 day course, starting today, to end on 11/27.**        SECONDARY DISCHARGE DIAGNOSES  Diagnosis: History of alcohol use  Assessment and Plan of Treatment: Given that you have a history of alcohol use, we started you on supplements called Thiamine and Folic acid. It is preferable that you take these. Be sure to follow up with your primary doctor as well.    Diagnosis: HTN (hypertension)  Assessment and Plan of Treatment: You had elevated blood pressure here in the hospital. We started a medication called Amlodipine 5 mg once a day. Be sure to take this as prescribed and follow up with your primary doctor.

## 2020-11-18 NOTE — DISCHARGE NOTE PROVIDER - PROVIDER TOKENS
PROVIDER:[TOKEN:[[id number]],FOLLOWUP:[1 week],ESTABLISHEDPATIENT:[T]] PROVIDER:[TOKEN:[[id number]],FOLLOWUP:[1 week],ESTABLISHEDPATIENT:[T]],PROVIDER:[TOKEN:[[id number]],FOLLOWUP:[2 weeks]]

## 2020-11-18 NOTE — PROGRESS NOTE ADULT - ASSESSMENT
ASSESSMENT  53 yo M w/ PMHx of HTN, HLD, Hypothyroidism, BEKAH, IVDU and EtOH Abuse (takes no meds due to lack of insurance) who came to the ED for Pain, redness and swelling of his Rt Hand and forearm x 4 days. Denies injection recently into the R arm, last use L arm 1 week ago.    IMPRESSION  #R wrist cellulitis/dermatitis in the setting of IVDU with meth   < from: Xray Hand 3 Views, Right (11.16.20 @ 17:02) >  A faint triangular-shaped radiopaque density measuring 3 mm seen at the radial volar aspect of the second finger, level of the proximal phalanx. Correlate clinically for foreign body.  #Sepsis ruled out on admission   #HIV-1/2 Combo Result: Nonreact (11-17-20 @ 10:50)  Creatinine, Serum: 0.8 (11-16-20 @ 17:15)      RECOMMENDATIONS  - on d/c PO clinda 450mg TID x 10 days  - foreign body on CXR  - Seen by Vascular: no evidence of compartment syndrome, Seen by Plastics: no intervention  - HCV Ab screening  - as improving, ok to hold off MRI  If any questions, please call or send a message on [employer] Teams  Spectra [phone]

## 2020-11-18 NOTE — DISCHARGE NOTE PROVIDER - HOSPITAL COURSE
The patient is an 55 yo M w/ PMHx of HTN, HLD, Hypothyroidism, BEKAH, IVDU and EtOH Abuse (takes no meds due to lack of insurance) who came to the ED for Pain, redness and swelling of his Rt Hand and forearm x 4 days. Per patient, he usually uses his Rt arm for IVDU, however last used his Lt arm 1 weeks ago. The patient has been having worsening pain, erythema and edema of his Rt arm and forearm 9/10 in intensity, which prompted him to come to the ED.   ED course: Patient HTN on initial encounter. Labs significant for leukocytosis. Duplex negative for DVT, xray revealed soft tissue swelling. Vascular saw pt, no clinical evidence of compartment syndrome. Plastic surgery saw pt, no surgical interventions needed. Pt seen by ID, received Clindamycin, Ceftriaxone in hospital. Blood Cx neg. Pt's hand swelling has been improving and can be discharged on PO Clindamycn for 10 day course. The patient is an 53 yo M w/ PMHx of HTN, HLD, Hypothyroidism, BEKAH, IVDU and EtOH Abuse (takes no meds due to lack of insurance) who came to the ED for Pain, redness and swelling of his Rt Hand and forearm x 4 days. Per patient, he usually uses his Rt arm for IVDU, however last used his Lt arm 1 weeks ago. The patient has been having worsening pain, erythema and edema of his Rt arm and forearm 9/10 in intensity, which prompted him to come to the ED.   ED course: Patient HTN on initial encounter. Labs significant for leukocytosis. Duplex negative for DVT, xray revealed soft tissue swelling. Vascular saw pt, no clinical evidence of compartment syndrome. Plastic surgery saw pt, no surgical interventions needed. Pt seen by ID, received Clindamycin, Ceftriaxone in hospital. Blood Cx neg. Pt's hand swelling has been improving and can be discharged on PO Clindamycn for 10 day course.    Attending addendum: Patient seen and examined. Doing well, agreeable to go home today. Cleared by ID for PO abx. Outpatient f/u with ID and drug rehab.

## 2020-11-18 NOTE — PROGRESS NOTE ADULT - SUBJECTIVE AND OBJECTIVE BOX
Progress Note: General Surgery  Patient: YENNY VILLEDA , 54y (1965)Male   MRN: [id number]  Location: [address]  Visit: 11-16-20 Inpatient  Date: 11-18-20 @ 10:16    Procedure/Diagnosis:     Events/ 24h: No acute events overnight. Pain controlled.    Vitals: T(F): 99 (11-18-20 @ 05:15), Max: 99.6 (11-17-20 @ 21:17)  HR: 94 (11-18-20 @ 05:15)  BP: 139/79 (11-18-20 @ 05:15) (139/79 - 146/82)  RR: 20 (11-18-20 @ 05:15)  SpO2: 97% (11-17-20 @ 19:38)    In:   Out:   Net:     Diet: Diet, DASH/TLC:   Sodium & Cholesterol Restricted (11-16-20 @ 19:58)    IV Fluids: folic acid 1 milliGRAM(s) Oral daily  thiamine 100 milliGRAM(s) Oral daily      Physical Examination:  General Appearance: NAD  HEENT: EOMI, sclera non-icteric.  Heart: RRR   Lungs: CTABL.   Abdomen:  Soft, nontender, nondistended.   MSK/Extremities: Warm & well-perfused.   Skin: Warm, dry. No jaundice.       Medications: [Standing]  amLODIPine   Tablet 5 milliGRAM(s) Oral daily  chlorhexidine 4% Liquid 1 Application(s) Topical <User Schedule>  clindamycin   Capsule 450 milliGRAM(s) Oral three times a day  folic acid 1 milliGRAM(s) Oral daily  heparin   Injectable 5000 Unit(s) SubCutaneous every 8 hours  lactobacillus acidophilus 1 Tablet(s) Oral daily  pantoprazole    Tablet 40 milliGRAM(s) Oral before breakfast  thiamine 100 milliGRAM(s) Oral daily    DVT Prophylaxis: heparin   Injectable 5000 Unit(s) SubCutaneous every 8 hours    GI Prophylaxis: pantoprazole    Tablet 40 milliGRAM(s) Oral before breakfast    Antibiotics: clindamycin   Capsule 450 milliGRAM(s) Oral three times a day    Anticoagulation:   Medications:[PRN]  LORazepam     Tablet 2 milliGRAM(s) Oral every 2 hours PRN      Labs:                        13.9   11.50 )-----------( 130      ( 18 Nov 2020 06:24 )             42.9     11-18    137  |  99  |  12  ----------------------------<  136<H>  4.0   |  26  |  0.8    Ca    9.2      18 Nov 2020 06:24    TPro  7.0  /  Alb  4.4  /  TBili  0.6  /  DBili  x   /  AST  29  /  ALT  40  /  AlkPhos  87  11-16    LIVER FUNCTIONS - ( 16 Nov 2020 17:15 )  Alb: 4.4 g/dL / Pro: 7.0 g/dL / ALK PHOS: 87 U/L / ALT: 40 U/L / AST: 29 U/L / GGT: x               CARDIAC MARKERS ( 17 Nov 2020 05:39 )  x     / x     / 121 U/L / x     / x          Urine/Micro:    Culture - Blood (collected 16 Nov 2020 17:15)  Source: .Blood Blood  Preliminary Report (17 Nov 2020 23:01):    No growth to date.    Culture - Blood (collected 16 Nov 2020 17:15)  Source: .Blood Blood  Preliminary Report (17 Nov 2020 23:01):    No growth to date.          Imaging:     Assessment:  55 yo M w/ PMHx of HTN, HLD, Hypothyroidism, BEKAH, IVDU and EtOH Abuse (takes no meds due to lack of insurance) admitted for cellulitis of right wrist extending to elbow    - c/w ABX  - no surgical intervention indicated at this time   - arm elevation   - Possi block    Date/Time: 11-18-20 @ 10:16   Progress Note: General Surgery  Patient: YENNY VILLEDA , 54y (1965)Male   MRN: [id number]  Location: [address]  Visit: 11-16-20 Inpatient  Date: 11-18-20 @ 10:16    Procedure/Diagnosis:     Events/ 24h: No acute events overnight. Pain controlled.    Vitals: T(F): 99 (11-18-20 @ 05:15), Max: 99.6 (11-17-20 @ 21:17)  HR: 94 (11-18-20 @ 05:15)  BP: 139/79 (11-18-20 @ 05:15) (139/79 - 146/82)  RR: 20 (11-18-20 @ 05:15)  SpO2: 97% (11-17-20 @ 19:38)    In:   Out:   Net:     Diet: Diet, DASH/TLC:   Sodium & Cholesterol Restricted (11-16-20 @ 19:58)    IV Fluids: folic acid 1 milliGRAM(s) Oral daily  thiamine 100 milliGRAM(s) Oral daily      Physical Examination:  General Appearance: NAD  HEENT: EOMI, sclera non-icteric.  Heart: RRR   Lungs: CTABL.   Abdomen:  Soft, nontender, nondistended.   MSK/Extremities: Warm & well-perfused.   Skin: Warm, dry. No jaundice.       Medications: [Standing]  amLODIPine   Tablet 5 milliGRAM(s) Oral daily  chlorhexidine 4% Liquid 1 Application(s) Topical <User Schedule>  clindamycin   Capsule 450 milliGRAM(s) Oral three times a day  folic acid 1 milliGRAM(s) Oral daily  heparin   Injectable 5000 Unit(s) SubCutaneous every 8 hours  lactobacillus acidophilus 1 Tablet(s) Oral daily  pantoprazole    Tablet 40 milliGRAM(s) Oral before breakfast  thiamine 100 milliGRAM(s) Oral daily    DVT Prophylaxis: heparin   Injectable 5000 Unit(s) SubCutaneous every 8 hours    GI Prophylaxis: pantoprazole    Tablet 40 milliGRAM(s) Oral before breakfast    Antibiotics: clindamycin   Capsule 450 milliGRAM(s) Oral three times a day    Anticoagulation:   Medications:[PRN]  LORazepam     Tablet 2 milliGRAM(s) Oral every 2 hours PRN      Labs:                        13.9   11.50 )-----------( 130      ( 18 Nov 2020 06:24 )             42.9     11-18    137  |  99  |  12  ----------------------------<  136<H>  4.0   |  26  |  0.8    Ca    9.2      18 Nov 2020 06:24    TPro  7.0  /  Alb  4.4  /  TBili  0.6  /  DBili  x   /  AST  29  /  ALT  40  /  AlkPhos  87  11-16    LIVER FUNCTIONS - ( 16 Nov 2020 17:15 )  Alb: 4.4 g/dL / Pro: 7.0 g/dL / ALK PHOS: 87 U/L / ALT: 40 U/L / AST: 29 U/L / GGT: x               CARDIAC MARKERS ( 17 Nov 2020 05:39 )  x     / x     / 121 U/L / x     / x          Urine/Micro:    Culture - Blood (collected 16 Nov 2020 17:15)  Source: .Blood Blood  Preliminary Report (17 Nov 2020 23:01):    No growth to date.    Culture - Blood (collected 16 Nov 2020 17:15)  Source: .Blood Blood  Preliminary Report (17 Nov 2020 23:01):    No growth to date.          Imaging:     Assessment:  55 yo M w/ PMHx of HTN, HLD, Hypothyroidism, BEKAH, IVDU and EtOH Abuse (takes no meds due to lack of insurance) admitted for cellulitis of right wrist extending to elbow    - c/w ABX  - no surgical intervention indicated at this time   - arm elevation   - Possi block  - f/u OP in 2 weeks    Date/Time: 11-18-20 @ 10:16

## 2020-11-18 NOTE — DISCHARGE NOTE NURSING/CASE MANAGEMENT/SOCIAL WORK - PATIENT PORTAL LINK FT
You can access the FollowMyHealth Patient Portal offered by [facility] by registering at the following website: [email or web address]. By joining [facility]’s FollowMyHealth portal, you will also be able to view your health information using other applications (apps) compatible with our system.

## 2020-11-18 NOTE — SBIRT NOTE ADULT - NSSBIRTALCPOSREINDET_GEN_A_CORE
Pt refused any referrals and stated that he was involved with the treatment Court and completed a phone screening with a program somewhere on [address], but did not recall the name of it. He said that he will follow up with them himself. Further resources provided to pt.

## 2020-11-18 NOTE — DISCHARGE NOTE PROVIDER - NSDCMRMEDTOKEN_GEN_ALL_CORE_FT
amLODIPine 5 mg oral tablet: 1 tab(s) orally once a day  clindamycin 150 mg oral capsule: 3 cap(s) orally 3 times a day - 11/18 - 11/27  folic acid 1 mg oral tablet: 1 tab(s) orally once a day  lactobacillus acidophilus oral capsule: 1 caplet(s) orally 1 to 2 times a day   thiamine 100 mg oral tablet: 1 tab(s) orally once a day

## 2020-11-18 NOTE — PROGRESS NOTE ADULT - SUBJECTIVE AND OBJECTIVE BOX
YENNY VILLEDA  54y, Male  Allergy: No Known Allergies      LOS  2d    CHIEF COMPLAINT: Cellulitis   (18 Nov 2020 09:43)      INTERVAL EVENTS/HPI  - No acute events overnight, decreased erythema/swelling  BCX NG mRSA NEGATIVE   - T(F): , Max: 99.6 (11-17-20 @ 21:17)  - Denies any worsening symptoms  - Tolerating medication  - WBC Count: 11.50 (11-18-20 @ 06:24)  WBC Count: 10.69 (11-17-20 @ 14:11)  - Creatinine, Serum: 0.8 (11-18-20 @ 06:24)  Creatinine, Serum: 0.8 (11-17-20 @ 14:11)       ROS  General: Denies rigors, nightsweats  HEENT: Denies headache, rhinorrhea, sore throat, eye pain  CV: Denies CP, palpitations  PULM: Denies wheezing, hemoptysis  GI: Denies hematemesis, hematochezia, melena  : Denies discharge, hematuria  MSK: Denies arthralgias, myalgias  SKIN: Denies rash, lesions  NEURO: Denies paresthesias, weakness  PSYCH: Denies depression, anxiety    VITALS:  T(F): 99, Max: 99.6 (11-17-20 @ 21:17)  HR: 94  BP: 139/79  RR: 20Vital Signs Last 24 Hrs  T(C): 37.2 (18 Nov 2020 05:15), Max: 37.6 (17 Nov 2020 21:17)  T(F): 99 (18 Nov 2020 05:15), Max: 99.6 (17 Nov 2020 21:17)  HR: 94 (18 Nov 2020 05:15) (93 - 97)  BP: 139/79 (18 Nov 2020 05:15) (139/79 - 146/82)  BP(mean): --  RR: 20 (18 Nov 2020 05:15) (18 - 20)  SpO2: 97% (17 Nov 2020 19:38) (97% - 97%)    PHYSICAL EXAM:  Gen: NAD, resting in bed  HEENT: Normocephalic, atraumatic  Neck: supple, no lymphadenopathy  CV: Regular rate & regular rhythm  Lungs: decreased BS at bases, no fremitus  Abdomen: Soft, BS present  Ext: Warm, well perfused, R arm decreased erythema, edema of the hand, blistering erythematous circumscribed lesion near R wrist  Neuro: non focal, awake  Skin: no rash, no erythema  Lines: no phlebitis    FH: Non-contributory  Social Hx: Non-contributory    TESTS & MEASUREMENTS:                        13.9   11.50 )-----------( 130      ( 18 Nov 2020 06:24 )             42.9     11-18    137  |  99  |  12  ----------------------------<  136<H>  4.0   |  26  |  0.8    Ca    9.2      18 Nov 2020 06:24    TPro  7.0  /  Alb  4.4  /  TBili  0.6  /  DBili  x   /  AST  29  /  ALT  40  /  AlkPhos  87  11-16    eGFR if : 116 mL/min/1.73M2 (11-18-20 @ 06:24)  eGFR if Non African American: 100 mL/min/1.73M2 (11-18-20 @ 06:24)  eGFR if : 116 mL/min/1.73M2 (11-17-20 @ 14:11)  eGFR if Non African American: 100 mL/min/1.73M2 (11-17-20 @ 14:11)    LIVER FUNCTIONS - ( 16 Nov 2020 17:15 )  Alb: 4.4 g/dL / Pro: 7.0 g/dL / ALK PHOS: 87 U/L / ALT: 40 U/L / AST: 29 U/L / GGT: x               Culture - Blood (collected 11-16-20 @ 17:15)  Source: .Blood Blood  Preliminary Report (11-17-20 @ 23:01):    No growth to date.    Culture - Blood (collected 11-16-20 @ 17:15)  Source: .Blood Blood  Preliminary Report (11-17-20 @ 23:01):    No growth to date.        Lactate, Blood: 1.1 mmol/L (11-17-20 @ 05:39)  Lactate, Blood: 1.1 mmol/L (11-16-20 @ 17:15)      INFECTIOUS DISEASES TESTING  MRSA PCR Result.: Negative (11-17-20 @ 11:30)  HIV-1/2 Combo Result: Nonreact (11-17-20 @ 10:50)  Procalcitonin, Serum: 0.18 (11-17-20 @ 05:39)  COVID-19 PCR: NotDetec (11-16-20 @ 17:00)      INFLAMMATORY MARKERS      RADIOLOGY & ADDITIONAL TESTS:  I have personally reviewed the last available Chest xray  CXR      CT      CARDIOLOGY TESTING  12 Lead ECG:   Ventricular Rate 83 BPM    Atrial Rate 83 BPM    P-R Interval 136 ms    QRS Duration 80 ms    Q-T Interval 336 ms    QTC Calculation(Bazett) 394 ms    P Axis 61 degrees    R Axis 42 degrees    T Axis 55 degrees    Diagnosis Line Normal sinus rhythm  Normal ECG    Confirmed by NACHO RAJPUT MD ([id number]) on 11/17/2020 9:18:24 AM (11-17-20 @ 07:58)      MEDICATIONS  amLODIPine   Tablet 5 Oral daily  chlorhexidine 4% Liquid 1 Topical <User Schedule>  clindamycin   Capsule 450 Oral three times a day  folic acid 1 Oral daily  heparin   Injectable 5000 SubCutaneous every 8 hours  lactobacillus acidophilus 1 Oral daily  pantoprazole    Tablet 40 Oral before breakfast  thiamine 100 Oral daily      WEIGHT  Weight (kg): 87.9 (11-17-20 @ 01:00)  Creatinine, Serum: 0.8 mg/dL (11-18-20 @ 06:24)  Creatinine, Serum: 0.8 mg/dL (11-17-20 @ 14:11)      ANTIBIOTICS:  clindamycin   Capsule 450 milliGRAM(s) Oral three times a day      All available historical records have been reviewed

## 2020-11-18 NOTE — DISCHARGE NOTE PROVIDER - CARE PROVIDER_API CALL
Heriberto Evans  Infectious Disease  [address] [place] [address]  Phone: [phone]  Fax: [phone]  Established Patient  Follow Up Time: 1 week   Heriberto Evans  Infectious Disease  [address] [place] [address]  Phone: [phone]  Fax: [phone]  Established Patient  Follow Up Time: 1 week    Ketan Jain  PLASTIC SURGERY  [facility] [place] [address]  Phone: [phone]  Fax: [phone]  Follow Up Time: 2 weeks

## 2020-11-21 LAB
CULTURE RESULTS: SIGNIFICANT CHANGE UP
CULTURE RESULTS: SIGNIFICANT CHANGE UP
SPECIMEN SOURCE: SIGNIFICANT CHANGE UP
SPECIMEN SOURCE: SIGNIFICANT CHANGE UP

## 2020-11-22 LAB
CULTURE RESULTS: SIGNIFICANT CHANGE UP
SPECIMEN SOURCE: SIGNIFICANT CHANGE UP

## 2020-11-25 DIAGNOSIS — I10 ESSENTIAL (PRIMARY) HYPERTENSION: ICD-10-CM

## 2020-11-25 DIAGNOSIS — Z59.7 INSUFFICIENT SOCIAL INSURANCE AND WELFARE SUPPORT: ICD-10-CM

## 2020-11-25 DIAGNOSIS — E03.9 HYPOTHYROIDISM, UNSPECIFIED: ICD-10-CM

## 2020-11-25 DIAGNOSIS — L03.113 CELLULITIS OF RIGHT UPPER LIMB: ICD-10-CM

## 2020-11-25 DIAGNOSIS — E78.5 HYPERLIPIDEMIA, UNSPECIFIED: ICD-10-CM

## 2020-11-25 DIAGNOSIS — F11.10 OPIOID ABUSE, UNCOMPLICATED: ICD-10-CM

## 2020-11-25 DIAGNOSIS — G47.33 OBSTRUCTIVE SLEEP APNEA (ADULT) (PEDIATRIC): ICD-10-CM

## 2020-11-25 DIAGNOSIS — D69.59 OTHER SECONDARY THROMBOCYTOPENIA: ICD-10-CM

## 2020-11-25 DIAGNOSIS — F10.10 ALCOHOL ABUSE, UNCOMPLICATED: ICD-10-CM

## 2020-11-25 SDOH — ECONOMIC STABILITY - INCOME SECURITY: INSUFFICIENT SOCIAL INSURANCE AND WELFARE SUPPORT: Z59.7

## 2021-03-18 NOTE — ED ADULT NURSE NOTE - PMH
Patient readmitted 3/16 under observation status. Will continue to monitor.  
Alcohol abuse    Drug use

## 2021-07-29 ENCOUNTER — EMERGENCY (EMERGENCY)
Facility: HOSPITAL | Age: 56
LOS: 0 days | Discharge: HOME | End: 2021-07-30
Attending: EMERGENCY MEDICINE | Admitting: EMERGENCY MEDICINE
Payer: MEDICAID

## 2021-07-29 VITALS
SYSTOLIC BLOOD PRESSURE: 125 MMHG | OXYGEN SATURATION: 96 % | WEIGHT: 190.04 LBS | RESPIRATION RATE: 18 BRPM | HEART RATE: 95 BPM | TEMPERATURE: 98 F | HEIGHT: 68 IN | DIASTOLIC BLOOD PRESSURE: 89 MMHG

## 2021-07-29 DIAGNOSIS — Z79.82 LONG TERM (CURRENT) USE OF ASPIRIN: ICD-10-CM

## 2021-07-29 DIAGNOSIS — R55 SYNCOPE AND COLLAPSE: ICD-10-CM

## 2021-07-29 DIAGNOSIS — R07.9 CHEST PAIN, UNSPECIFIED: ICD-10-CM

## 2021-07-29 DIAGNOSIS — I10 ESSENTIAL (PRIMARY) HYPERTENSION: ICD-10-CM

## 2021-07-29 DIAGNOSIS — Z87.891 PERSONAL HISTORY OF NICOTINE DEPENDENCE: ICD-10-CM

## 2021-07-29 DIAGNOSIS — E78.5 HYPERLIPIDEMIA, UNSPECIFIED: ICD-10-CM

## 2021-07-29 DIAGNOSIS — G47.33 OBSTRUCTIVE SLEEP APNEA (ADULT) (PEDIATRIC): ICD-10-CM

## 2021-07-29 DIAGNOSIS — E03.9 HYPOTHYROIDISM, UNSPECIFIED: ICD-10-CM

## 2021-07-29 LAB
ALBUMIN SERPL ELPH-MCNC: 4.8 G/DL — SIGNIFICANT CHANGE UP (ref 3.5–5.2)
ALP SERPL-CCNC: 89 U/L — SIGNIFICANT CHANGE UP (ref 30–115)
ALT FLD-CCNC: 38 U/L — SIGNIFICANT CHANGE UP (ref 0–41)
ANION GAP SERPL CALC-SCNC: 12 MMOL/L — SIGNIFICANT CHANGE UP (ref 7–14)
AST SERPL-CCNC: 26 U/L — SIGNIFICANT CHANGE UP (ref 0–41)
BASOPHILS # BLD AUTO: 0.05 K/UL — SIGNIFICANT CHANGE UP (ref 0–0.2)
BASOPHILS NFR BLD AUTO: 0.5 % — SIGNIFICANT CHANGE UP (ref 0–1)
BILIRUB SERPL-MCNC: 0.5 MG/DL — SIGNIFICANT CHANGE UP (ref 0.2–1.2)
BUN SERPL-MCNC: 20 MG/DL — SIGNIFICANT CHANGE UP (ref 10–20)
CALCIUM SERPL-MCNC: 10.7 MG/DL — HIGH (ref 8.5–10.1)
CHLORIDE SERPL-SCNC: 94 MMOL/L — LOW (ref 98–110)
CO2 SERPL-SCNC: 28 MMOL/L — SIGNIFICANT CHANGE UP (ref 17–32)
CREAT SERPL-MCNC: 0.8 MG/DL — SIGNIFICANT CHANGE UP (ref 0.7–1.5)
EOSINOPHIL # BLD AUTO: 0.09 K/UL — SIGNIFICANT CHANGE UP (ref 0–0.7)
EOSINOPHIL NFR BLD AUTO: 0.9 % — SIGNIFICANT CHANGE UP (ref 0–8)
GLUCOSE SERPL-MCNC: 108 MG/DL — HIGH (ref 70–99)
HCT VFR BLD CALC: 49.5 % — SIGNIFICANT CHANGE UP (ref 42–52)
HGB BLD-MCNC: 16.8 G/DL — SIGNIFICANT CHANGE UP (ref 14–18)
IMM GRANULOCYTES NFR BLD AUTO: 0.3 % — SIGNIFICANT CHANGE UP (ref 0.1–0.3)
LYMPHOCYTES # BLD AUTO: 2.31 K/UL — SIGNIFICANT CHANGE UP (ref 1.2–3.4)
LYMPHOCYTES # BLD AUTO: 22.2 % — SIGNIFICANT CHANGE UP (ref 20.5–51.1)
MAGNESIUM SERPL-MCNC: 1.8 MG/DL — SIGNIFICANT CHANGE UP (ref 1.8–2.4)
MCHC RBC-ENTMCNC: 28.2 PG — SIGNIFICANT CHANGE UP (ref 27–31)
MCHC RBC-ENTMCNC: 33.9 G/DL — SIGNIFICANT CHANGE UP (ref 32–37)
MCV RBC AUTO: 83.2 FL — SIGNIFICANT CHANGE UP (ref 80–94)
MONOCYTES # BLD AUTO: 0.83 K/UL — HIGH (ref 0.1–0.6)
MONOCYTES NFR BLD AUTO: 8 % — SIGNIFICANT CHANGE UP (ref 1.7–9.3)
NEUTROPHILS # BLD AUTO: 7.08 K/UL — HIGH (ref 1.4–6.5)
NEUTROPHILS NFR BLD AUTO: 68.1 % — SIGNIFICANT CHANGE UP (ref 42.2–75.2)
NRBC # BLD: 0 /100 WBCS — SIGNIFICANT CHANGE UP (ref 0–0)
PLATELET # BLD AUTO: 196 K/UL — SIGNIFICANT CHANGE UP (ref 130–400)
POTASSIUM SERPL-MCNC: 4.6 MMOL/L — SIGNIFICANT CHANGE UP (ref 3.5–5)
POTASSIUM SERPL-SCNC: 4.6 MMOL/L — SIGNIFICANT CHANGE UP (ref 3.5–5)
PROT SERPL-MCNC: 7.7 G/DL — SIGNIFICANT CHANGE UP (ref 6–8)
RBC # BLD: 5.95 M/UL — SIGNIFICANT CHANGE UP (ref 4.7–6.1)
RBC # FLD: 13.3 % — SIGNIFICANT CHANGE UP (ref 11.5–14.5)
SARS-COV-2 RNA SPEC QL NAA+PROBE: SIGNIFICANT CHANGE UP
SODIUM SERPL-SCNC: 134 MMOL/L — LOW (ref 135–146)
TROPONIN T SERPL-MCNC: <0.01 NG/ML — SIGNIFICANT CHANGE UP
TROPONIN T SERPL-MCNC: <0.01 NG/ML — SIGNIFICANT CHANGE UP
WBC # BLD: 10.39 K/UL — SIGNIFICANT CHANGE UP (ref 4.8–10.8)
WBC # FLD AUTO: 10.39 K/UL — SIGNIFICANT CHANGE UP (ref 4.8–10.8)

## 2021-07-29 PROCEDURE — 93010 ELECTROCARDIOGRAM REPORT: CPT | Mod: 77

## 2021-07-29 PROCEDURE — 93010 ELECTROCARDIOGRAM REPORT: CPT

## 2021-07-29 PROCEDURE — 71045 X-RAY EXAM CHEST 1 VIEW: CPT | Mod: 26

## 2021-07-29 PROCEDURE — 99220: CPT

## 2021-07-29 RX ORDER — SODIUM CHLORIDE 9 MG/ML
1000 INJECTION INTRAMUSCULAR; INTRAVENOUS; SUBCUTANEOUS ONCE
Refills: 0 | Status: COMPLETED | OUTPATIENT
Start: 2021-07-29 | End: 2021-07-29

## 2021-07-29 RX ORDER — ASPIRIN/CALCIUM CARB/MAGNESIUM 324 MG
324 TABLET ORAL ONCE
Refills: 0 | Status: COMPLETED | OUTPATIENT
Start: 2021-07-29 | End: 2021-07-29

## 2021-07-29 RX ADMIN — Medication 324 MILLIGRAM(S): at 18:25

## 2021-07-29 RX ADMIN — SODIUM CHLORIDE 1000 MILLILITER(S): 9 INJECTION INTRAMUSCULAR; INTRAVENOUS; SUBCUTANEOUS at 16:59

## 2021-07-29 RX ADMIN — SODIUM CHLORIDE 1000 MILLILITER(S): 9 INJECTION INTRAMUSCULAR; INTRAVENOUS; SUBCUTANEOUS at 15:29

## 2021-07-29 NOTE — ED ADULT NURSE NOTE - OBJECTIVE STATEMENT
patient states he was fine and all of a sudden felt very light headed, like he was going to pass out. states he sat down and the feeling passed. also states he thinks he had some chest pain when he felt light headed but is denying any chest pain now. states he is in detox for alcohol and methamphetamines but it has been over a week since he last took any thing or had anything  to drink.

## 2021-07-29 NOTE — ED ADULT TRIAGE NOTE - CHIEF COMPLAINT QUOTE
Pt BIBA for one episode of near syncope at lunch today. Pt denies fall, denies LOC. Pt states "I was able to lower myself onto the chair I never passed out". Pt reports mild chest pain after event, denies CP at this time.

## 2021-07-29 NOTE — ED CDU PROVIDER INITIAL DAY NOTE - PHYSICAL EXAMINATION
VITALS: Reviewed  CONSTITUTIONAL: well developed, well nourished, in no acute distress, speaking in full sentences, nontoxic appearing  SKIN: warm, dry, no rash  HEAD: normocephalic, atraumatic  ENT: patent airway, moist mucous membranes  NECK: supple, no masses  CV:  regular rate, regular rhythm, 2+ radial pulses bilaterally  RESP: no wheezes, no rales, no rhonchi, normal work of breathing  ABD: soft, nontender, nondistended, no rebound, no guarding  MSK: normal ROM, no cyanosis, no edema  NEURO: alert, oriented x3  PSYCH: cooperative, appropriate

## 2021-07-29 NOTE — ED PROVIDER NOTE - CLINICAL SUMMARY MEDICAL DECISION MAKING FREE TEXT BOX
55yoM with h/o HTN, HLD, BEKAH, h/o IVDA and alcohol abuse, noncompliant with meds x 2 yrs, presents with syncope. Reports feeling lightheadedness, tingling of fingers, and as if was going to pass out, and put his head down on the table. On awakening he felt a knot in his stomach and SOB, resolved after 5 minutes. No recent cardiac w/u. Last drink was 1 wk ago and states no longer drinks regularly, coming from [facility]. Denies leg pain or swelling, vomiting, diarrhea, black or bloody stool, recent travel, fever, cough, and all other symptoms. On exam, afebrile, hemodynamically stable, saturating well, NAD, well appearing, laying comfortably in bed, head NCAT, EOMI grossly, anicteric, MMM, no JVD, RRR, nml S1/S2, no m/r/g, lungs CTAB, no w/r/r, abd soft, NT, ND, nml BS, no rebound or guarding, AAO, CN's 3-12 grossly intact, MOREL spontaneously, no leg cyanosis or edema, skin warm, well perfused, no rashes or hives. Character low suspicion for dissection and no murmur or pulse asymmetry. Character low suspicion for PE and no tachycardia, hypoxia, or e/o DVT or acute risk factors for this. Character low suspicion for ACS and ECG/trop unremarkable. No e/o PNA, PTX, or fluid overload on exam or CXR. No arrhythmia or e/o aortic outflow obstruction. No anemia or bleeding or gross electrolyte abnormality. No e/o alcohol withdrawal or intoxication. Patient is well appearing, NAD, afebrile, hemodynamically stable, asymptomatic. Given fluids, ASA. Sent to obs for ACS and syncope w/u.

## 2021-07-29 NOTE — ED PROVIDER NOTE - ATTENDING CONTRIBUTION TO CARE
55yoM with h/o HTN, HLD, BEKAH, h/o IVDA and alcohol abuse, noncompliant with meds x 2 yrs, presents with syncope. Reports feeling lightheadedness, tingling of fingers, and as if was going to pass out, and put his head down on the table. On awakening he felt a knot in his stomach and SOB, resolved after 5 minutes. No recent cardiac w/u. Last drink was 1 wk ago and states no longer drinks regularly, coming from [facility]. Denies leg pain or swelling, vomiting, diarrhea, black or bloody stool, recent travel, fever, cough, and all other symptoms. On exam, afebrile, hemodynamically stable, saturating well, NAD, well appearing, laying comfortably in bed, head NCAT, EOMI grossly, anicteric, MMM, no JVD, RRR, nml S1/S2, no m/r/g, lungs CTAB, no w/r/r, abd soft, NT, ND, nml BS, no rebound or guarding, AAO, CN's 3-12 grossly intact, MOREL spontaneously, no leg cyanosis or edema, skin warm, well perfused, no rashes or hives. Character low suspicion for dissection and no murmur or pulse asymmetry. Character low suspicion for PE and no tachycardia, hypoxia, or e/o DVT or acute risk factors for this. Character low suspicion for ACS and ECG/trop unremarkable. No e/o PNA, PTX, or fluid overload on exam or CXR. No arrhythmia or e/o aortic outflow obstruction. No anemia or bleeding or gross electrolyte abnormality. No e/o alcohol withdrawal or intoxication. Patient is well appearing, NAD, afebrile, hemodynamically stable, asymptomatic. Given fluids, ASA. Sent to obs for ACS and syncope w/u.

## 2021-07-29 NOTE — ED CDU PROVIDER INITIAL DAY NOTE - NS ED ROS FT
Review of Systems:  CONSTITUTIONAL - No fever  SKIN - No rash  HEMATOLOGIC - No abnormal bleeding or bruising  RESPIRATORY - No shortness of breath, No cough  GI - No abdominal pain, No nausea, No vomiting, No diarrhea  MUSCULOSKELETAL - No joint paint, No swelling, No back pain  NEUROLOGIC - No numbness, No focal weakness, No headache, No dizziness  All other systems negative, unless specified in HPI

## 2021-07-29 NOTE — ED CDU PROVIDER INITIAL DAY NOTE - OBJECTIVE STATEMENT
55Y M with PMH of HTN, HLD, Kidney stones, hypothyroid, BEKAH not compliant with CPAP, IVDA, alcohol abuse (last used 1 week ago), cocaine/meth use (1 month ago) presenting for syncopal event. After eating lunch around 1 pm patient felt dizzy, had tunnel vision, felt chest discomfort, and felt like he was going to pass out as endorsed to prior to team, to me patient endorsing patient did have a syncopal event. Patient denies head trauma. No numbness, weakness, tingling, headache, shortness of breath, nausea, vomiting, diarrhea, dysuria, hematuria, melena, hematochezia. Denies recent drug/alcohol use. No leg swelling, no hemoptysis, recent immmbolizations/hospitalizations. Previous stress test "long time ago". Doesn't follow with a cardiologist.

## 2021-07-29 NOTE — ED PROVIDER NOTE - NS ED ROS FT
Constitutional:  (-) fever, (-) chills, (-) lethargy  Eyes:  (-) eye pain (+) visual changes  ENMT: (-) nasal discharge, (-) sore throat. (-) neck pain or stiffness  Cardiac: (+) chest pain (-) palpitations  Respiratory:  (-) cough (-) respiratory distress.   GI:  (-) nausea (-) vomiting (-) diarrhea (-) abdominal pain.  :  (-) dysuria (-) frequency (-) burning.  MS:  (-) back pain (-) joint pain.  Neuro:  (-) headache (-) numbness (-) tingling (-) focal weakness  Skin:  (-) rash  Except as documented in the HPI,  all other systems are negative

## 2021-07-30 VITALS
RESPIRATION RATE: 18 BRPM | HEART RATE: 79 BPM | DIASTOLIC BLOOD PRESSURE: 85 MMHG | OXYGEN SATURATION: 100 % | TEMPERATURE: 97 F | SYSTOLIC BLOOD PRESSURE: 154 MMHG

## 2021-07-30 PROCEDURE — 93306 TTE W/DOPPLER COMPLETE: CPT | Mod: 26

## 2021-07-30 PROCEDURE — 99217: CPT

## 2021-07-30 PROCEDURE — 75574 CT ANGIO HRT W/3D IMAGE: CPT | Mod: 26,MA

## 2021-07-30 RX ORDER — ASPIRIN/CALCIUM CARB/MAGNESIUM 324 MG
1 TABLET ORAL
Qty: 30 | Refills: 0
Start: 2021-07-30 | End: 2021-08-28

## 2021-07-30 RX ORDER — ATORVASTATIN CALCIUM 80 MG/1
1 TABLET, FILM COATED ORAL
Qty: 30 | Refills: 0
Start: 2021-07-30 | End: 2021-08-28

## 2021-07-30 RX ORDER — METOPROLOL TARTRATE 50 MG
100 TABLET ORAL ONCE
Refills: 0 | Status: COMPLETED | OUTPATIENT
Start: 2021-07-30 | End: 2021-07-30

## 2021-07-30 RX ORDER — SODIUM CHLORIDE 9 MG/ML
1000 INJECTION INTRAMUSCULAR; INTRAVENOUS; SUBCUTANEOUS ONCE
Refills: 0 | Status: COMPLETED | OUTPATIENT
Start: 2021-07-30 | End: 2021-07-30

## 2021-07-30 RX ADMIN — Medication 100 MILLIGRAM(S): at 09:37

## 2021-07-30 RX ADMIN — SODIUM CHLORIDE 1000 MILLILITER(S): 9 INJECTION INTRAMUSCULAR; INTRAVENOUS; SUBCUTANEOUS at 09:38

## 2021-07-30 RX ADMIN — Medication 1 MILLIGRAM(S): at 09:37

## 2021-07-30 RX ADMIN — Medication 100 MILLIGRAM(S): at 08:46

## 2021-07-30 NOTE — ED ADULT NURSE REASSESSMENT NOTE - NS ED NURSE REASSESS COMMENT FT1
Pt reassessed A/O times 4 Vs stable report filling slight better CTA order is done completed ,pt is see evaluate by ED attending clear to go home NAD noted. .

## 2021-07-30 NOTE — ED CDU PROVIDER SUBSEQUENT DAY NOTE - MEDICAL DECISION MAKING DETAILS
56yo man h/o substance use disorder sent in from rehab after syncopal event preceded by chest discomfort. ED workup was unremarkable. Pt was monitored without incident; repeat EKG and enzymes unchanged. VS, exam as noted, pt comfortable and well appearing on my eval. Plan is for CCTA, echo.

## 2021-07-30 NOTE — ED CDU PROVIDER DISPOSITION NOTE - CARE PROVIDER_API CALL
your PMD,   Phone: (   )    -  Fax: (   )    -  Follow Up Time: 1-3 Days    Heriberto Berg  CARDIOVASCULAR DISEASE  [address] [address]  Phone: [phone]  Fax: [phone]  Follow Up Time: 1-3 Days

## 2021-07-30 NOTE — ED CDU PROVIDER DISPOSITION NOTE - NSFOLLOWUPINSTRUCTIONS_ED_ALL_ED_FT
Chest Pain    Chest pain can be caused by many different conditions which may or may not be dangerous. Causes include heartburn, lung infections, heart attack, blood clot in lungs, skin infections, strain or damage to muscle, cartilage, or bones, etc. In addition to a history and physical examination, an electrocardiogram (ECG) or other lab tests may have been performed to determine the cause of your chest pain. Follow up with your primary care provider or with a cardiologist as instructed.     SEEK IMMEDIATE MEDICAL CARE IF YOU HAVE ANY OF THE FOLLOWING SYMPTOMS: worsening chest pain, coughing up blood, unexplained back/neck/jaw pain, severe abdominal pain, dizziness or lightheadedness, fainting, shortness of breath, sweaty or clammy skin, vomiting, or racing heart beat. These symptoms may represent a serious problem that is an emergency. Do not wait to see if the symptoms will go away. Get medical help right away. Call 911 and do not drive yourself to the hospital.    Near-Syncope    Near-syncope is when you suddenly become weak or dizzy, or you feel like you might pass out (faint). During an episode of near-syncope, you may:    Feel dizzy or light-headed.  Feel nauseous.  See all white or all black in your field of vision.  Have cold, clammy skin.     This condition is caused by a sudden decrease in blood flow to the brain. This decrease can result from various causes, but most of those causes are not dangerous. However, near-syncope can be a sign of a serious medical problem, so it is important to seek medical care.     If you fainted, get medical help right away. Call your local emergency services (911 in the [place] Do not drive yourself to the hospital.     HOME CARE INSTRUCTIONS  Pay attention to any changes in your symptoms. Take these actions to help with your condition:    Have someone stay with you until you feel stable.    Do not drive, use machinery, or play sports until your health care provider says it is okay.    Keep all follow-up visits as told by your health care provider. This is important.    If you start to feel like you might faint, lie down right away and raise (elevate) your feet above the level of your heart. Breathe deeply and steadily. Wait until all of the symptoms have passed.   Drink enough fluid to keep your urine clear or pale yellow.    If you are taking blood pressure or heart medicine, get up slowly and take several minutes to sit and then stand. This can reduce dizziness.    Take over-the-counter and prescription medicines only as told by your health care provider.      SEEK IMMEDIATE MEDICAL CARE IF:  You have a severe headache.    You have unusual pain in your chest, abdomen, or back.    You are bleeding from your mouth or rectum, or you have black or tarry stool.    You have a very fast or irregular heartbeat (palpitations).    You faint once or repeatedly.   You have a seizure.   You are confused.    You have trouble walking.    You have severe weakness.    You have vision problems.      These symptoms may represent a serious problem that is an emergency. Do not wait to see if your symptoms will go away. Get medical help right away. Call your local emergency services (911 in the [place] Do not drive yourself to the hospital.    ADDITIONAL NOTES AND INSTRUCTIONS    Please follow up with your Primary MD in 24-48 hr.  Seek immediate medical care for any new/worsening signs or symptoms.

## 2021-07-30 NOTE — ED CDU PROVIDER DISPOSITION NOTE - PROVIDER TOKENS
FREE:[LAST:[your PMD],PHONE:[(   )    -],FAX:[(   )    -],FOLLOWUP:[1-3 Days]],PROVIDER:[TOKEN:[[id number]],FOLLOWUP:[1-3 Days]]

## 2021-07-30 NOTE — ED CDU PROVIDER DISPOSITION NOTE - PATIENT PORTAL LINK FT
You can access the FollowMyHealth Patient Portal offered by [facility] by registering at the following website: [email or web address]. By joining [email or web address]’s FollowMyHealth portal, you will also be able to view your health information using other applications (apps) compatible with our system.

## 2021-07-30 NOTE — ED CDU PROVIDER DISPOSITION NOTE - CLINICAL COURSE
54yo man h/o substance use disorder sent in from rehab after syncopal event preceded by chest discomfort. ED workup was unremarkable. Pt was monitored without incident; repeat EKG and enzymes unchanged. VS, exam as noted, pt comfortable and well appearing on my eval. CCTA was CAD-RAD 2 for minimal and mild narrowing of LAD; incidental pulm nodules were also noted. All discussed with pt. Echo was grossly normal; pt to begin asa and statin, f/u with cardiology. Return precautions discussed, pt comfortable with discharge.

## 2021-07-30 NOTE — ED CDU PROVIDER SUBSEQUENT DAY NOTE - ATTENDING CONTRIBUTION TO CARE
54yo man h/o substance use disorder sent in from rehab after syncopal event preceded by chest discomfort. ED workup was unremarkable. Pt was monitored without incident; repeat EKG and enzymes unchanged. VS, exam as noted, pt comfortable and well appearing on my eval. Plan is for CCTA, echo.

## 2021-07-30 NOTE — ED CDU PROVIDER DISPOSITION NOTE - ATTENDING CONTRIBUTION TO CARE
56yo man h/o substance use disorder sent in from rehab after syncopal event preceded by chest discomfort. ED workup was unremarkable. Pt was monitored without incident; repeat EKG and enzymes unchanged. VS, exam as noted, pt comfortable and well appearing on my eval. CCTA was CAD-RAD 2 for minimal and mild narrowing of LAD; incidental pulm nodules were also noted. All discussed with pt. Echo was grossly normal; pt to begin asa and statin, f/u with cardiology. Return precautions discussed, pt comfortable with discharge.

## 2021-08-21 ENCOUNTER — EMERGENCY (EMERGENCY)
Facility: HOSPITAL | Age: 56
LOS: 0 days | Discharge: HOME | End: 2021-08-22
Attending: STUDENT IN AN ORGANIZED HEALTH CARE EDUCATION/TRAINING PROGRAM | Admitting: STUDENT IN AN ORGANIZED HEALTH CARE EDUCATION/TRAINING PROGRAM
Payer: MEDICAID

## 2021-08-21 VITALS
DIASTOLIC BLOOD PRESSURE: 90 MMHG | SYSTOLIC BLOOD PRESSURE: 124 MMHG | RESPIRATION RATE: 18 BRPM | OXYGEN SATURATION: 98 % | WEIGHT: 199.96 LBS | HEIGHT: 68 IN | TEMPERATURE: 98 F | HEART RATE: 95 BPM

## 2021-08-21 DIAGNOSIS — R21 RASH AND OTHER NONSPECIFIC SKIN ERUPTION: ICD-10-CM
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
## 2021-08-21 DIAGNOSIS — Z86.59 PERSONAL HISTORY OF OTHER MENTAL AND BEHAVIORAL DISORDERS: ICD-10-CM

## 2021-08-21 DIAGNOSIS — E03.9 HYPOTHYROIDISM, UNSPECIFIED: ICD-10-CM

## 2021-08-21 DIAGNOSIS — Z79.899 OTHER LONG TERM (CURRENT) DRUG THERAPY: ICD-10-CM
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
## 2021-08-21 DIAGNOSIS — Z79.02 LONG TERM (CURRENT) USE OF ANTITHROMBOTICS/ANTIPLATELETS: ICD-10-CM

## 2021-08-21 DIAGNOSIS — F17.200 NICOTINE DEPENDENCE, UNSPECIFIED, UNCOMPLICATED: ICD-10-CM

## 2021-08-21 DIAGNOSIS — I10 ESSENTIAL (PRIMARY) HYPERTENSION: ICD-10-CM

## 2021-08-21 DIAGNOSIS — L29.9 PRURITUS, UNSPECIFIED: ICD-10-CM

## 2021-08-21 DIAGNOSIS — Z79.82 LONG TERM (CURRENT) USE OF ASPIRIN: ICD-10-CM

## 2021-08-21 DIAGNOSIS — T78.40XA ALLERGY, UNSPECIFIED, INITIAL ENCOUNTER: ICD-10-CM

## 2021-08-21 DIAGNOSIS — E78.5 HYPERLIPIDEMIA, UNSPECIFIED: ICD-10-CM

## 2021-08-21 DIAGNOSIS — Z87.09 PERSONAL HISTORY OF OTHER DISEASES OF THE RESPIRATORY SYSTEM: ICD-10-CM

## 2021-08-21 PROCEDURE — 93010 ELECTROCARDIOGRAM REPORT: CPT

## 2021-08-21 PROCEDURE — 99284 EMERGENCY DEPT VISIT MOD MDM: CPT

## 2021-08-21 NOTE — ED ADULT NURSE REASSESSMENT NOTE - NS ED NURSE REASSESS COMMENT FT1
Pt receive from previous RN  Pt A/ox4 Pt in bed comfortably. NAD at this time. Cardiac monitor in place Pt on RA sat 96%.

## 2021-08-21 NOTE — ED ADULT NURSE NOTE - OBJECTIVE STATEMENT
Pt received two vaccines today, hep A and B and developed rash on the lower abdomen and arms. No sob. RN in rehab administered EPI pen.

## 2021-08-21 NOTE — ED ADULT TRIAGE NOTE - CHIEF COMPLAINT QUOTE
[facility] and [facility] he got the Hep A+B shot today and latter on he developed a rash to the RLQ abdomen so the staff at the rehab gave patient benadryl po and an EPI shot. pt aox3 , no respiratory distress, no rash no hives at this time.

## 2021-08-21 NOTE — ED PROVIDER NOTE - PHYSICAL EXAMINATION
CONSTITUTIONAL: Well-developed; well-nourished; in no acute distress.   SKIN: warm, dry  HEAD: Normocephalic; atraumatic.  EYES: PERRL, EOMI, normal sclera and conjunctiva   ENT: No nasal discharge; airway clear. No swelling  NECK: Supple; non tender.  CARD:  Regular rate and rhythm.   RESP: No inc WOB. CTA b/l  ABD: Sparse maculopapular rash to b/l inguinal area. Nontender. Abd soft, nontender  EXT: Sparse maculopapular rash b/l anterior wrists. Normal ROM. 2+ radial pulses.  LYMPH: No acute cervical adenopathy.  NEURO: Alert, oriented, grossly unremarkable  PSYCH: Cooperative, appropriate.

## 2021-08-21 NOTE — ED PROVIDER NOTE - CLINICAL SUMMARY MEDICAL DECISION MAKING FREE TEXT BOX
I personally evaluated the patient. I reviewed the Resident’s or Physician Assistant’s note (as assigned above), and agree with the findings and plan except as documented in my note. Patient evaluated for allergic reaction.  Given EpiPen and Benadryl at nursing home.  Patient with complete resolution of symptoms.  Monitored in the ED, no change in status, patient well-appearing and asymptomatic. I have fully discussed the medical management and delivery of care with the patient. I have discussed any available labs, imaging and treatment options with the patient. Patient confirms understanding and has been given detailed return precautions. Patient instructed to return to the ED should symptoms persist or worsen. Patient has demonstrated capacity and has verbalized understanding. Patient is well appearing upon discharge.

## 2021-08-21 NOTE — ED PROVIDER NOTE - PROGRESS NOTE DETAILS
[place]: Rash improved as per patient. No signs of anaphylaxis. Will observe until 4 hours from epipen administration [place]: Pt feels well. hemodynamically stable.

## 2021-08-21 NOTE — ED PROVIDER NOTE - NS ED ROS FT
Eyes:  No visual changes, eye pain or discharge.  ENMT:  No hearing changes, pain, no sore throat or runny nose, no difficulty swallowing  Cardiac:  No chest pain, SOB or edema. No chest pain with exertion.  Respiratory:  No cough or respiratory distress. No hemoptysis. No history of asthma or RAD.  GI:  No nausea, vomiting, diarrhea or abdominal pain.  :  No dysuria, frequency or burning.  MS:  No myalgia, muscle weakness, joint pain or back pain.  Neuro:  No headache or weakness.  No LOC.  Skin:  +rash, pruritis.  Endocrine: No history of thyroid disease or diabetes.

## 2021-08-21 NOTE — ED PROVIDER NOTE - OBJECTIVE STATEMENT
54 y/o M with PMH of HTN, HLD, hypothyroid, BEKAH, IVDA, alcohol abuse, cocaine use presenting with rash and itchiness of b/l hands/arms and b/l groin starting approx 3 hrs ago. Symptoms began after dinner at rehab and staff gave him PO benadryl and epipen injection. Reports improvement of rash compared to original appearance and itchiness has resolved. Denies swelling, SOB, headache, dizziness, chest pain, N/V/D, abdominal pain, numbness, tingling. Pt reports he received hep A/B vaccination today at rehab around 1pm. Denies use of new soap, lotion, foods, etc.

## 2021-08-21 NOTE — ED PROVIDER NOTE - ATTENDING CONTRIBUTION TO CARE
55-year-old male past medical history of hypertension, hyperlipidemia, hypothyroid, EtOH abuse presents with rash.  Patient noted to have diffuse hive-like rash and itchiness to bilateral hands, arms, trunk, groin after dinner.  Patient given Benadryl and EpiPen injection at nursing home.  Patient currently asymptomatic, states that rash is resolved.  No fever/chills, no shortness of breath, no throat swelling, no lip swelling, no nausea/vomiting, no rash, no new exposures.    CONSTITUTIONAL: Well-developed; well-nourished; in no acute distress. Sitting up and providing appropriate history and physical examination  SKIN: skin exam is warm and dry, no acute rash.  HEAD: Normocephalic; atraumatic.  EYES: PERRL, 3 mm bilateral, no nystagmus, EOM intact; conjunctiva and sclera clear.  ENT: No nasal discharge; airway clear.  NECK: Supple; non tender. + full passive ROM in all directions. No JVD  CARD: S1, S2 normal; no murmurs, gallops, or rubs. Regular rate and rhythm. + Symmetric Strong Pulses  RESP: No wheezes, rales or rhonchi. Good air movement bilaterally  ABD: soft; non-distended; non-tender. No Rebound, No Guarding, No signs of peritonitis, No CVA tenderness. No pulsatile abdominal mass. + Strong and Symmetric Pulses  EXT: Normal ROM. No clubbing, cyanosis or edema. Dp and Pt Pulses intact. Cap refill less than 3 seconds  NEURO: CN 2-12 intact, normal finger to nose, normal romberg, stable gait, no sensory or motor deficits, Alert, oriented, grossly unremarkable. No Focal deficits. GCS 15. NIH 0  PSYCH: Cooperative, appropriate.

## 2021-08-21 NOTE — ED PROVIDER NOTE - NSFOLLOWUPCLINICS_GEN_ALL_ED_FT
[facility]  Medicine  [address], [place]   Phone: [phone]  Fax:   Follow Up Time: 1-3 Days

## 2021-08-21 NOTE — ED PROVIDER NOTE - PATIENT PORTAL LINK FT
You can access the FollowMyHealth Patient Portal offered by [facility] by registering at the following website: [email or web address]. By joining [employer]’s FollowMyHealth portal, you will also be able to view your health information using other applications (apps) compatible with our system.

## 2021-08-21 NOTE — ED ADULT NURSE NOTE - CHIEF COMPLAINT QUOTE
[facility] and today he got the Hep A+B shot today and latter on he developed a rash to the RLQ abdomen so the staff at the rehab gave patient benadryl po and an EPI shot. pt aox3 , no respiratory distress, no rash no hives at this time.

## 2021-08-31 ENCOUNTER — APPOINTMENT (OUTPATIENT)
Dept: INTERNAL MEDICINE | Facility: CLINIC | Age: 56
End: 2021-08-31

## 2021-10-29 ENCOUNTER — TRANSCRIPTION ENCOUNTER (OUTPATIENT)
Age: 56
End: 2021-10-29

## 2022-02-17 ENCOUNTER — EMERGENCY (EMERGENCY)
Facility: HOSPITAL | Age: 57
LOS: 0 days | Discharge: HOME | End: 2022-02-17
Attending: EMERGENCY MEDICINE | Admitting: EMERGENCY MEDICINE
Payer: MEDICAID

## 2022-02-17 VITALS
SYSTOLIC BLOOD PRESSURE: 140 MMHG | HEART RATE: 89 BPM | TEMPERATURE: 98 F | OXYGEN SATURATION: 98 % | DIASTOLIC BLOOD PRESSURE: 89 MMHG | HEIGHT: 68 IN | WEIGHT: 190.04 LBS | RESPIRATION RATE: 18 BRPM

## 2022-02-17 DIAGNOSIS — I10 ESSENTIAL (PRIMARY) HYPERTENSION: ICD-10-CM

## 2022-02-17 DIAGNOSIS — Z87.891 PERSONAL HISTORY OF NICOTINE DEPENDENCE: ICD-10-CM

## 2022-02-17 DIAGNOSIS — Z79.82 LONG TERM (CURRENT) USE OF ASPIRIN: ICD-10-CM

## 2022-02-17 DIAGNOSIS — R51.9 HEADACHE, UNSPECIFIED: ICD-10-CM
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
## 2022-02-17 DIAGNOSIS — S09.90XA UNSPECIFIED INJURY OF HEAD, INITIAL ENCOUNTER: ICD-10-CM
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
## 2022-02-17 PROCEDURE — 99285 EMERGENCY DEPT VISIT HI MDM: CPT

## 2022-02-17 PROCEDURE — 70450 CT HEAD/BRAIN W/O DYE: CPT | Mod: 26,MA

## 2022-02-17 PROCEDURE — 72125 CT NECK SPINE W/O DYE: CPT | Mod: 26,MA

## 2022-02-17 RX ORDER — ACETAMINOPHEN 500 MG
975 TABLET ORAL ONCE
Refills: 0 | Status: COMPLETED | OUTPATIENT
Start: 2022-02-17 | End: 2022-02-17

## 2022-02-17 RX ADMIN — Medication 975 MILLIGRAM(S): at 12:14

## 2022-02-17 NOTE — ED PROVIDER NOTE - PATIENT PORTAL LINK FT
You can access the FollowMyHealth Patient Portal offered by [facility] by registering at the following website: [email or web address]. By joining [employer]’s FollowMyHealth portal, you will also be able to view your health information using other applications (apps) compatible with our system.

## 2022-02-17 NOTE — ED PROVIDER NOTE - NSFOLLOWUPINSTRUCTIONS_ED_ALL_ED_FT
Please follow up with your primary care physician within 24-72 hours and return immediately if symptoms worsen.    General Headache Without Cause  A headache is pain or discomfort felt around the head or neck area. The specific cause of a headache may not be found. There are many causes and types of headaches. A few common ones are:    Tension headaches.  Migraine headaches.  Cluster headaches.  Chronic daily headaches.    Follow these instructions at home:  Watch your condition for any changes. Take these steps to help with your condition:    Managing pain     Take over-the-counter and prescription medicines only as told by your health care provider.  Lie down in a dark, quiet room when you have a headache.  If directed, apply ice to the head and neck area:    Put ice in a plastic bag.  Place a towel between your skin and the bag.  Leave the ice on for 20 minutes, 2–3 times per day.    Use a heating pad or hot shower to apply heat to the head and neck area as told by your health care provider.  ImageKeep lights dim if bright lights bother you or make your headaches worse.  Eating and drinking     Eat meals on a regular schedule.  Limit alcohol use.  Decrease the amount of caffeine you drink, or stop drinking caffeine.  General instructions     Keep all follow-up visits as told by your health care provider. This is important.  Keep a headache journal to help find out what may trigger your headaches. For example, write down:    What you eat and drink.  How much sleep you get.  Any change to your diet or medicines.    Try massage or other relaxation techniques.  Limit stress.  Sit up straight, and do not tense your muscles.  Do not use tobacco products, including cigarettes, chewing tobacco, or e-cigarettes. If you need help quitting, ask your health care provider.  Exercise regularly as told by your health care provider.  ImageSleep on a regular schedule. Get 7–9 hours of sleep, or the amount recommended by your health care provider.  Contact a health care provider if:  Your symptoms are not helped by medicine.  You have a headache that is different from the usual headache.  You have nausea or you vomit.  You have a fever.  Get help right away if:  Your headache becomes severe.  You have repeated vomiting.  You have a stiff neck.  You have a loss of vision.  You have problems with speech.  You have pain in the eye or ear.  You have muscular weakness or loss of muscle control.  You lose your balance or have trouble walking.  You feel faint or pass out.  You have confusion.  This information is not intended to replace advice given to you by your health care provider. Make sure you discuss any questions you have with your health care provider.    Concussion    WHAT YOU NEED TO KNOW:    A concussion is a mild brain injury. It is usually caused by a bump or blow to the head from a fall, a motor vehicle crash, or a sports injury. Sometimes being shaken forcefully may cause a concussion.    DISCHARGE INSTRUCTIONS:    Have someone call 911 for any of the following:     Someone tries to wake you and cannot do so.      You have a seizure, increasing confusion, or a change in personality.      Your speech becomes slurred, or you have new vision problems.    Return to the emergency department if:     You have sudden and new vision problems.      You have a severe headache that does not go away.      You have arm or leg weakness, numbness, or new problems with coordination.      You have blood or clear fluid coming out of the ears or nose.    Contact your healthcare provider if:     You have nausea or are vomiting.      You feel more sleepy than usual.      Your symptoms get worse.      Your symptoms last longer than 6 weeks after the injury.      You have questions or concerns about your condition or care.    Medicines: You may need any of the following:     Acetaminophen decreases pain and fever. It is available without a doctor's order. Ask how much to take and how often to take it. Follow directions. Read the labels of all other medicines you are using to see if they also contain acetaminophen, or ask your doctor or pharmacist. Acetaminophen can cause liver damage if not taken correctly. Do not use more than 4 grams (4,000 milligrams) total of acetaminophen in one day.       NSAIDs help decrease swelling and pain or fever. This medicine is available with or without a doctor's order. NSAIDs can cause stomach bleeding or kidney problems in certain people. If you take blood thinner medicine, always ask your healthcare provider if NSAIDs are safe for you. Always read the medicine label and follow directions.      Take your medicine as directed. Contact your healthcare provider if you think your medicine is not helping or if you have side effects. Tell him or her if you are allergic to any medicine. Keep a list of the medicines, vitamins, and herbs you take. Include the amounts, and when and why you take them. Bring the list or the pill bottles to follow-up visits. Carry your medicine list with you in case of an emergency.    Self-care: Concussion symptoms usually go away within about 10 days, but they may last longer. The following may be recommended to manage your symptoms:     Rest from physical and mental activities as directed. Mental activities are those that require thinking, concentration, and attention. You will need to rest until your symptoms are gone. Rest will allow you to recover from your concussion. Ask your healthcare provider when you can return to work and other daily activities.      Have someone stay with you for the first 24 hours after your injury. Your healthcare provider should be contacted if your symptoms get worse, or you develop new symptoms.      Do not participate in sports and physical activities until your healthcare provider says it is okay. They could make your symptoms worse or lead to another concussion. Your healthcare provider will tell you when it is okay for you to return to sports or physical activities. Ask for more information about sports concussions.    Prevent another concussion:     Wear protective sports equipment that fits properly. Helmets help decrease your risk for a serious brain injury. Talk to your healthcare provider about ways you can decrease your risk for a concussion if you play sports.      Wear your seatbelt every time you travel. This helps to decrease your risk for a head injury if you are in a car accident.     Follow up with your healthcare provider as directed: Write down your questions so you remember to ask them during your visits.        © Copyright [employer] 2019 All illustrations and images included in CareNotes are the copyrighted property of [employer] or [employer].

## 2022-02-17 NOTE — ED PROVIDER NOTE - NSFOLLOWUPCLINICS_GEN_ALL_ED_FT
[facility] Concussion Program  Concussion Program  [address] [place] [address]   Phone: [phone]  Fax:   Follow Up Time: 1-3 Days

## 2022-02-17 NOTE — ED PROVIDER NOTE - ATTENDING CONTRIBUTION TO CARE
55 y/o male h/o HTN p/w occipital HA s/p fall from 3 rungs off ladder yesterday, constant, denies modifying factors, denies other injuries, LOC, paresthesias or other complaints at present.     Old chart reviewed.  I have reviewed and agree with the initial nursing note, except as documented in my note.    VSS, awake, alert, non-toxic appearing, Head NC / NT, PERRL / EOMI, no dental injury, no abrasions or lacerations, chest CTAB, chest wall NT, no w/r/r, +S1/S2, RRR, no m/r/g, abdomen soft, NT, ND, +BS, able to voluntarily actively rotate neck 45 degrees left and right on request, no midline spinal tenderness, no CVA tenderness, FROM x 4, no bony tenderness, alert and oriented to person, place and time, clear speech, coordination and gait are normal.

## 2022-02-17 NOTE — ED ADULT NURSE NOTE - OBJECTIVE STATEMENT
Pt came to ER after falling off a ladder yesterday approximately 4ft. Patient hit the back of his head, no blood thinner, no LOC, no SOB, CP. A/O/4, ambulating with steady gait.

## 2022-02-17 NOTE — ED ADULT TRIAGE NOTE - CHIEF COMPLAINT QUOTE
Patient complaining of head pain after falling off a ladder yesterday approximately 4ft. Patient hit the back of his head, not on blood thinner, did not lose consciousness. Complaining of head pain. Aox4, ambulatory in triage.

## 2022-02-17 NOTE — ED PROVIDER NOTE - PHYSICAL EXAMINATION
Gen: NAD, AOx3  Head: NCAT  HEENT: PERRL, oral mucosa moist, normal conjunctiva, oropharynx clear without exudate or erythema  Lung: CTAB, no respiratory distress, no wheezing, rales, rhonchi  CV: normal s1/s2, rrr, Normal perfusion, pulses 2+ throughout  Abd: soft, NTND, no CVA tenderness  Genitourinary: no pelvic tenderness  MSK: No edema, no visible deformities, full range of motion in all 4 extremities, no spinal tenderness   Neuro: CN II-XII grossly intact, No focal neurologic deficits, no nystagmus/pronator drift, strength 5/5 BUE/BLE, steady gait   Skin: No rash   Psych: normal affect

## 2022-02-17 NOTE — ED PROVIDER NOTE - OBJECTIVE STATEMENT
55 yo male with a pmh of HTN presents after a fall. pt states he fell about 30ft from a ladder yesterday onto his back. pt states to have hit his head with no LOC. pt states has experienced a sharp generalized headache since the event. pt denies any other symptoms including fevers, chill, n/v, back pain, neck pain, recent illness/travel, cough, abdominal pain, chest pain, or SOB.

## 2022-02-17 NOTE — ED PROVIDER NOTE - CARE PLAN
Principal Discharge DX:	Fall  Secondary Diagnosis:	Headache   1 Principal Discharge DX:	Fall  Secondary Diagnosis:	Headache  Secondary Diagnosis:	Closed head injury

## 2022-07-26 NOTE — ED ADULT NURSE NOTE - HISTORY OF COVID-19 VACCINATION
[address] [facility] [address]  [place] [address]  Phone: [phone]   [phone]                        MD Wayne Sun MD Chequita Williams, MD Matthew Bassett, PA-C Allison Hoffritz, ALEXIA Cruz NP      Date: 07/26/2022        Patient: Jazz Martinez  YOB: 1945      Please fax office notes, Imaging, procedure notes to [phone]        Sincerely,     Electronically Signed By: Elle Mack, ALEXIA           Yes

## 2022-09-21 ENCOUNTER — APPOINTMENT (OUTPATIENT)
Dept: PULMONOLOGY | Facility: CLINIC | Age: 57
End: 2022-09-21

## 2022-10-16 NOTE — PROGRESS NOTE ADULT - REASON FOR ADMISSION
Cellulitis  Compartment syndrome?
No

## 2023-06-29 NOTE — ED ADULT NURSE NOTE - BREATHING, MLM
Subjective   Patient ID: Naldo Alcocer Jr is a 66 y.o. male.    HPI  Patient is a healthy 66-year-old  male comes in for recheck on hyperlipidemia also has had some elevated blood pressures in the past but today is 148/79.  Patient denies any complaints with exception of some orthopedic issues right shoulder is somewhat problematic since the previous injury skiing.  Review of Systems   Constitutional: Negative.    HENT: Negative.     Respiratory: Negative.     Cardiovascular: Negative.    Gastrointestinal: Negative.    Genitourinary: Negative.    Musculoskeletal:  Positive for arthralgias and myalgias.   Neurological: Negative.        Objective   Physical Exam  General no acute process no icterus well-hydrated alert active oriented    HEENT normocephalic no palpable tenderness eyes pupils equal reactive light and accommodation extraocular muscles intact no icterus and/or erythema ears benign external auditory canal no gross deformities nose no discharge drainage erythema bleeding throat no erythema.    Heart regular rate and rhythm without S3-S4 or murmur    Lungs clear to auscultation x2 no rales or rhonchi    Abdomen soft nontender nondistended no palpable masses no organomegaly splenomegaly.    Integument no rash no lumps bumps or concerning lesions.    Neurologic no tics tremors or seizures no decreased range of motion or ataxia.    Musculoskeletal good range of motion no gross abnormalities noted  Assessment/Plan   There are no diagnoses linked to this encounter.      
Spontaneous, unlabored and symmetrical

## 2024-01-26 NOTE — ED ADULT NURSE NOTE - CHIEF COMPLAINT
The patient is a 54y Male complaining of arm pain/injury.
I will SWITCH the dose or number of times a day I take the medications listed below when I get home from the hospital:  None

## 2024-09-12 ENCOUNTER — NON-APPOINTMENT (OUTPATIENT)
Age: 59
End: 2024-09-12

## 2024-09-17 ENCOUNTER — INPATIENT (INPATIENT)
Facility: HOSPITAL | Age: 59
LOS: 4 days | Discharge: HOME | DRG: 720 | End: 2024-09-22
Attending: HOSPITALIST | Admitting: INTERNAL MEDICINE
Payer: MEDICAID

## 2024-09-17 VITALS
HEIGHT: 68 IN | HEART RATE: 88 BPM | SYSTOLIC BLOOD PRESSURE: 110 MMHG | WEIGHT: 158.95 LBS | TEMPERATURE: 98 F | RESPIRATION RATE: 17 BRPM | DIASTOLIC BLOOD PRESSURE: 72 MMHG | OXYGEN SATURATION: 99 %

## 2024-09-17 DIAGNOSIS — K52.9 NONINFECTIVE GASTROENTERITIS AND COLITIS, UNSPECIFIED: ICD-10-CM

## 2024-09-17 LAB
ALBUMIN SERPL ELPH-MCNC: 3.6 G/DL — SIGNIFICANT CHANGE UP (ref 3.5–5.2)
ALP SERPL-CCNC: 95 U/L — SIGNIFICANT CHANGE UP (ref 30–115)
ALT FLD-CCNC: 36 U/L — SIGNIFICANT CHANGE UP (ref 0–41)
ANION GAP SERPL CALC-SCNC: 12 MMOL/L — SIGNIFICANT CHANGE UP (ref 7–14)
AST SERPL-CCNC: 15 U/L — SIGNIFICANT CHANGE UP (ref 0–41)
BASOPHILS # BLD AUTO: 0.1 K/UL — SIGNIFICANT CHANGE UP (ref 0–0.2)
BASOPHILS NFR BLD AUTO: 0.7 % — SIGNIFICANT CHANGE UP (ref 0–1)
BILIRUB SERPL-MCNC: 0.5 MG/DL — SIGNIFICANT CHANGE UP (ref 0.2–1.2)
BUN SERPL-MCNC: 9 MG/DL — LOW (ref 10–20)
C DIFF GDH STL QL: NEGATIVE — SIGNIFICANT CHANGE UP
C DIFF GDH STL QL: SIGNIFICANT CHANGE UP
CALCIUM SERPL-MCNC: 8.8 MG/DL — SIGNIFICANT CHANGE UP (ref 8.4–10.5)
CHLORIDE SERPL-SCNC: 97 MMOL/L — LOW (ref 98–110)
CO2 SERPL-SCNC: 27 MMOL/L — SIGNIFICANT CHANGE UP (ref 17–32)
CREAT SERPL-MCNC: 0.9 MG/DL — SIGNIFICANT CHANGE UP (ref 0.7–1.5)
EGFR: 98 ML/MIN/1.73M2 — SIGNIFICANT CHANGE UP
EOSINOPHIL # BLD AUTO: 0.03 K/UL — SIGNIFICANT CHANGE UP (ref 0–0.7)
EOSINOPHIL NFR BLD AUTO: 0.2 % — SIGNIFICANT CHANGE UP (ref 0–8)
GLUCOSE BLDC GLUCOMTR-MCNC: 136 MG/DL — HIGH (ref 70–99)
GLUCOSE BLDC GLUCOMTR-MCNC: 97 MG/DL — SIGNIFICANT CHANGE UP (ref 70–99)
GLUCOSE SERPL-MCNC: 110 MG/DL — HIGH (ref 70–99)
HCT VFR BLD CALC: 41.4 % — LOW (ref 42–52)
HGB BLD-MCNC: 14.4 G/DL — SIGNIFICANT CHANGE UP (ref 14–18)
IMM GRANULOCYTES NFR BLD AUTO: 2.3 % — HIGH (ref 0.1–0.3)
LACTATE SERPL-SCNC: 3 MMOL/L — HIGH (ref 0.7–2)
LACTATE SERPL-SCNC: 3.2 MMOL/L — HIGH (ref 0.7–2)
LIDOCAIN IGE QN: 70 U/L — HIGH (ref 7–60)
LYMPHOCYTES # BLD AUTO: 1.79 K/UL — SIGNIFICANT CHANGE UP (ref 1.2–3.4)
LYMPHOCYTES # BLD AUTO: 12 % — LOW (ref 20.5–51.1)
MAGNESIUM SERPL-MCNC: 1.6 MG/DL — LOW (ref 1.8–2.4)
MCHC RBC-ENTMCNC: 28.9 PG — SIGNIFICANT CHANGE UP (ref 27–31)
MCHC RBC-ENTMCNC: 34.8 G/DL — SIGNIFICANT CHANGE UP (ref 32–37)
MCV RBC AUTO: 83 FL — SIGNIFICANT CHANGE UP (ref 80–94)
MONOCYTES # BLD AUTO: 1.23 K/UL — HIGH (ref 0.1–0.6)
MONOCYTES NFR BLD AUTO: 8.2 % — SIGNIFICANT CHANGE UP (ref 1.7–9.3)
NEUTROPHILS # BLD AUTO: 11.47 K/UL — HIGH (ref 1.4–6.5)
NEUTROPHILS NFR BLD AUTO: 76.6 % — HIGH (ref 42.2–75.2)
NRBC # BLD: 0 /100 WBCS — SIGNIFICANT CHANGE UP (ref 0–0)
PLATELET # BLD AUTO: 218 K/UL — SIGNIFICANT CHANGE UP (ref 130–400)
PMV BLD: 9.9 FL — SIGNIFICANT CHANGE UP (ref 7.4–10.4)
POTASSIUM SERPL-MCNC: 3.1 MMOL/L — LOW (ref 3.5–5)
POTASSIUM SERPL-SCNC: 3.1 MMOL/L — LOW (ref 3.5–5)
PROT SERPL-MCNC: 5.8 G/DL — LOW (ref 6–8)
RBC # BLD: 4.99 M/UL — SIGNIFICANT CHANGE UP (ref 4.7–6.1)
RBC # FLD: 13.3 % — SIGNIFICANT CHANGE UP (ref 11.5–14.5)
SODIUM SERPL-SCNC: 136 MMOL/L — SIGNIFICANT CHANGE UP (ref 135–146)
WBC # BLD: 14.97 K/UL — HIGH (ref 4.8–10.8)
WBC # FLD AUTO: 14.97 K/UL — HIGH (ref 4.8–10.8)

## 2024-09-17 PROCEDURE — 84100 ASSAY OF PHOSPHORUS: CPT

## 2024-09-17 PROCEDURE — 99223 1ST HOSP IP/OBS HIGH 75: CPT

## 2024-09-17 PROCEDURE — 87077 CULTURE AEROBIC IDENTIFY: CPT

## 2024-09-17 PROCEDURE — 87449 NOS EACH ORGANISM AG IA: CPT

## 2024-09-17 PROCEDURE — 87045 FECES CULTURE AEROBIC BACT: CPT

## 2024-09-17 PROCEDURE — 80053 COMPREHEN METABOLIC PANEL: CPT

## 2024-09-17 PROCEDURE — 99285 EMERGENCY DEPT VISIT HI MDM: CPT

## 2024-09-17 PROCEDURE — 74177 CT ABD & PELVIS W/CONTRAST: CPT | Mod: 26,MC

## 2024-09-17 PROCEDURE — 87040 BLOOD CULTURE FOR BACTERIA: CPT

## 2024-09-17 PROCEDURE — 87507 IADNA-DNA/RNA PROBE TQ 12-25: CPT

## 2024-09-17 PROCEDURE — 36415 COLL VENOUS BLD VENIPUNCTURE: CPT

## 2024-09-17 PROCEDURE — 83036 HEMOGLOBIN GLYCOSYLATED A1C: CPT

## 2024-09-17 PROCEDURE — 83605 ASSAY OF LACTIC ACID: CPT

## 2024-09-17 PROCEDURE — 87324 CLOSTRIDIUM AG IA: CPT

## 2024-09-17 PROCEDURE — 85027 COMPLETE CBC AUTOMATED: CPT

## 2024-09-17 PROCEDURE — 83735 ASSAY OF MAGNESIUM: CPT

## 2024-09-17 PROCEDURE — 81003 URINALYSIS AUTO W/O SCOPE: CPT

## 2024-09-17 PROCEDURE — 87186 SC STD MICRODIL/AGAR DIL: CPT

## 2024-09-17 PROCEDURE — 85025 COMPLETE CBC W/AUTO DIFF WBC: CPT

## 2024-09-17 PROCEDURE — 80048 BASIC METABOLIC PNL TOTAL CA: CPT

## 2024-09-17 PROCEDURE — 87184 SC STD DISK METHOD PER PLATE: CPT

## 2024-09-17 PROCEDURE — 82962 GLUCOSE BLOOD TEST: CPT

## 2024-09-17 PROCEDURE — 87798 DETECT AGENT NOS DNA AMP: CPT

## 2024-09-17 PROCEDURE — 83690 ASSAY OF LIPASE: CPT

## 2024-09-17 RX ORDER — SODIUM CHLORIDE IRRIG SOLUTION 0.9 %
1000 SOLUTION, IRRIGATION IRRIGATION
Refills: 0 | Status: DISCONTINUED | OUTPATIENT
Start: 2024-09-17 | End: 2024-09-22

## 2024-09-17 RX ORDER — ALCOHOL ANTISEPTIC PADS
15 PADS, MEDICATED (EA) TOPICAL ONCE
Refills: 0 | Status: DISCONTINUED | OUTPATIENT
Start: 2024-09-17 | End: 2024-09-22

## 2024-09-17 RX ORDER — ALCOHOL ANTISEPTIC PADS
25 PADS, MEDICATED (EA) TOPICAL ONCE
Refills: 0 | Status: DISCONTINUED | OUTPATIENT
Start: 2024-09-17 | End: 2024-09-22

## 2024-09-17 RX ORDER — ALCOHOL ANTISEPTIC PADS
12.5 PADS, MEDICATED (EA) TOPICAL ONCE
Refills: 0 | Status: DISCONTINUED | OUTPATIENT
Start: 2024-09-17 | End: 2024-09-22

## 2024-09-17 RX ORDER — SODIUM CHLORIDE IRRIG SOLUTION 0.9 %
1100 SOLUTION, IRRIGATION IRRIGATION ONCE
Refills: 0 | Status: COMPLETED | OUTPATIENT
Start: 2024-09-17 | End: 2024-09-17

## 2024-09-17 RX ORDER — MAGNESIUM SULFATE 500 MG/ML
2 VIAL (ML) INJECTION ONCE
Refills: 0 | Status: COMPLETED | OUTPATIENT
Start: 2024-09-17 | End: 2024-09-17

## 2024-09-17 RX ORDER — ACETAMINOPHEN 325 MG
650 TABLET ORAL ONCE
Refills: 0 | Status: COMPLETED | OUTPATIENT
Start: 2024-09-17 | End: 2024-09-17

## 2024-09-17 RX ORDER — ENOXAPARIN SODIUM 150 MG/ML
40 INJECTION SUBCUTANEOUS EVERY 24 HOURS
Refills: 0 | Status: DISCONTINUED | OUTPATIENT
Start: 2024-09-17 | End: 2024-09-22

## 2024-09-17 RX ORDER — SODIUM CHLORIDE IRRIG SOLUTION 0.9 %
1500 SOLUTION, IRRIGATION IRRIGATION ONCE
Refills: 0 | Status: DISCONTINUED | OUTPATIENT
Start: 2024-09-17 | End: 2024-09-17

## 2024-09-17 RX ORDER — GLUCAGON INJECTION, SOLUTION 0.5 MG/.1ML
1 INJECTION, SOLUTION SUBCUTANEOUS ONCE
Refills: 0 | Status: DISCONTINUED | OUTPATIENT
Start: 2024-09-17 | End: 2024-09-22

## 2024-09-17 RX ORDER — INSULIN LISPRO 100/ML
VIAL (ML) SUBCUTANEOUS
Refills: 0 | Status: DISCONTINUED | OUTPATIENT
Start: 2024-09-17 | End: 2024-09-22

## 2024-09-17 RX ORDER — ONDANSETRON HCL/PF 4 MG/2 ML
4 VIAL (ML) INJECTION EVERY 6 HOURS
Refills: 0 | Status: DISCONTINUED | OUTPATIENT
Start: 2024-09-17 | End: 2024-09-22

## 2024-09-17 RX ORDER — SODIUM CHLORIDE IRRIG SOLUTION 0.9 %
1000 SOLUTION, IRRIGATION IRRIGATION ONCE
Refills: 0 | Status: COMPLETED | OUTPATIENT
Start: 2024-09-17 | End: 2024-09-17

## 2024-09-17 RX ADMIN — Medication 40 MILLIEQUIVALENT(S): at 10:42

## 2024-09-17 RX ADMIN — Medication 100 MILLILITER(S): at 19:08

## 2024-09-17 RX ADMIN — Medication 500 MILLIGRAM(S): at 22:04

## 2024-09-17 RX ADMIN — Medication 1000 MILLILITER(S): at 10:07

## 2024-09-17 RX ADMIN — Medication 25 GRAM(S): at 10:42

## 2024-09-17 RX ADMIN — Medication 1100 MILLILITER(S): at 17:21

## 2024-09-17 RX ADMIN — Medication 650 MILLIGRAM(S): at 19:23

## 2024-09-17 RX ADMIN — ENOXAPARIN SODIUM 40 MILLIGRAM(S): 150 INJECTION SUBCUTANEOUS at 22:04

## 2024-09-17 RX ADMIN — Medication 200 MILLIGRAM(S): at 17:47

## 2024-09-17 NOTE — H&P ADULT - NSHPLABSRESULTS_GEN_ALL_CORE
LABS:  cret                        14.4   14.97 )-----------( 218      ( 17 Sep 2024 09:45 )             41.4     09-17    136  |  97[L]  |  9[L]  ----------------------------<  110[H]  3.1[L]   |  27  |  0.9    Ca    8.8      17 Sep 2024 09:45  Mg     1.6     09-17    TPro  5.8[L]  /  Alb  3.6  /  TBili  0.5  /  DBili  x   /  AST  15  /  ALT  36  /  AlkPhos  95  09-17

## 2024-09-17 NOTE — ED PROVIDER NOTE - CONSIDERATION OF ADMISSION OBSERVATION
Consideration of Admission/Observation Admitted for colitis with electrolyte abnormalities poor p.o. intake.

## 2024-09-17 NOTE — ED PROVIDER NOTE - PHYSICAL EXAMINATION
VITAL SIGNS: I have reviewed nursing notes and confirm.  CONSTITUTIONAL: well-appearing, non-toxic, NAD  SKIN: Warm dry  HEAD: NCAT  EYES: EOMI, PERRLA  ENT: Dry mucous membranes, normal pharynx with no erythema or exudates  NECK: Supple  CARD: RRR, no murmurs, rubs or gallops  RESP: clear to ausculation b/l.  No rales, rhonchi, or wheezing.  ABD: soft, non-tender, non-distended, no rebound or guarding.  EXT: Full ROM, no bony tenderness  NEURO: Grossly intact  PSYCH: Cooperative, appropriate. VITAL SIGNS: I have reviewed nursing notes and confirm.  CONSTITUTIONAL: well-appearing, non-toxic, NAD  SKIN: Warm dry  HEAD: NCAT  EYES: EOMI, PERRLA  ENT: Dry mucous membranes, normal pharynx with no erythema or exudates  NECK: Supple  CARD: RRR, no murmurs, rubs or gallops  RESP: clear to ausculation b/l.  No rales, rhonchi, or wheezing.  ABD: soft, diffusely TTP, non-distended, no rebound or guarding.  EXT: Full ROM, no bony tenderness  NEURO: Grossly intact  PSYCH: Cooperative, appropriate.

## 2024-09-17 NOTE — ED ADULT NURSE NOTE - NSFALLOOBATTEMPT_ED_ALL_ED
Daily Note     Today's date: 2019  Patient name: Shyanne Malone  : 1954  MRN: [id number]  Referring provider: Laurie Sims  No

## 2024-09-17 NOTE — ED PROVIDER NOTE - OBJECTIVE STATEMENT
58-year-old male PMH HTN presents to the ED complaining of abdominal pain and diarrhea x 1 week. Patient also reports fevers, Tmax 102 yesterday. Endorses poor appetite and 15 pound weight loss over the past week. Patient states he visited a friend who had a family member from a nursing home with diarrhea, and symptoms started shortly after. Reports multiple daily episodes of watery diarrhea. Denies black/bloody stools. Denies headache, dizziness, syncope/near syncope, chest pain, shortness of breath, vomiting, dysuria or hematuria.

## 2024-09-17 NOTE — ED ADULT TRIAGE NOTE - TEMPERATURE IN FAHRENHEIT (DEGREES F)
Spoke with holly and she wanted to confirm that patient is following up with someone for her wound. 98.1

## 2024-09-17 NOTE — ED ADULT TRIAGE NOTE - CHIEF COMPLAINT QUOTE
"I've been having belly pain & diarrhea for over a week, I lost weight over 10 lbs." (+) fever TMAX 102 F yesterday, poor appetite. Pt exposed to friends who have the same GI s/sx. Triage IN=882.

## 2024-09-17 NOTE — ED ADULT NURSE NOTE - NSFALLUNIVINTERV_ED_ALL_ED
Bed/Stretcher in lowest position, wheels locked, appropriate side rails in place/Call bell, personal items and telephone in reach/Instruct patient to call for assistance before getting out of bed/chair/stretcher/Non-slip footwear applied when patient is off stretcher/[place] to call system/Physically safe environment - no spills, clutter or unnecessary equipment/Purposeful proactive rounding/Room/bathroom lighting operational, light cord in reach

## 2024-09-17 NOTE — H&P ADULT - ASSESSMENT
58-year-old male PMH HTN presents to the ED complaining of abdominal pain and diarrhea x 1 week. Patient also reports fevers, Tmax 102 yesterday. Endorses poor appetite and 15 pound weight loss over the past week. Patient visited a friend who had diarrhea along with his whole family who reportedly contracted the illness from their mother in a nursing home when they visited her, and symptoms started shortly after. Reports multiple daily episodes of watery diarrhea. Denies black/bloody stools. Denies headache, dizziness, syncope/near syncope, chest pain, shortness of breath, vomiting, dysuria or hematuria.    VS all wnl  Labs WBC 15, K- 3.1, Mg 1.6, glucose 210, lipase 70  CT abd   Mild wall thickening of the wall of sigmoid colon is noted possibly   related to underlying colitis. (3/300) No bowel obstruction, collections   or ascites  Unremarkable appendix.  received 1 L LR, IV mag, Potassium    #Non-bloody diarrhea  #Sigmoid Colitis  - GI PCR  - C Diff  - cipro, flagyl  - replete electrolytes     #Hyperglycemia  - a1c  - SS    #HTN  - off meds    DVT proph- lovenox  Diet-   Act order- PT  AM labs

## 2024-09-17 NOTE — H&P ADULT - NSHPPHYSICALEXAM_GEN_ALL_CORE
LOS:     VITALS:   T(C): 36.8 (09-17-24 @ 10:48), Max: 36.8 (09-17-24 @ 10:48)  HR: 88 (09-17-24 @ 08:16) (88 - 88)  BP: 110/72 (09-17-24 @ 08:16) (110/72 - 110/72)  RR: 17 (09-17-24 @ 08:16) (17 - 17)  SpO2: 99% (09-17-24 @ 08:16) (99% - 99%)    GENERAL: NAD, lying in bed comfortably  HEAD:  Atraumatic, Normocephalic  EYES: EOMI, PERRLA, conjunctiva and sclera clear  ENT: Moist mucous membranes  NECK: Supple, No JVD  CHEST/LUNG: Clear to auscultation bilaterally; No rales, rhonchi, wheezing, or rubs. Unlabored respirations  HEART: Regular rate and rhythm; No murmurs, rubs, or gallops  ABDOMEN: BSx4; Soft, nontender, nondistended  EXTREMITIES:  2+ Peripheral Pulses, brisk capillary refill. No clubbing, cyanosis, or edema  NERVOUS SYSTEM:  A&Ox3, no focal deficits   SKIN: No rashes or lesions

## 2024-09-17 NOTE — ED ADULT NURSE NOTE - CHIEF COMPLAINT QUOTE
"I've been having belly pain & diarrhea for over a week, I lost weight over 10 lbs." (+) fever TMAX 102 F yesterday, poor appetite. Pt exposed to friends who have the same GI s/sx. Triage GR=196.

## 2024-09-17 NOTE — ED PROVIDER NOTE - CLINICAL SUMMARY MEDICAL DECISION MAKING FREE TEXT BOX
4-year-old male history of hypertension presents for ration of abdominal pain diarrhea associate with fever yesterday and decreased p.o. intake with unintentional weight loss due to poor p.o. intake.  Here patient found to have a white count of 15,000 with potassium of 3.1, and maG 1.6.  CT demonstrates colitis given antibiotics admitted for colitis with poor p.o. intake abdominal pain.

## 2024-09-17 NOTE — ED PROVIDER NOTE - PROGRESS NOTE DETAILS
Patient noted to be hypokalemic and hypomagnesemic on CMP, electrolytes repleted.  CT shows colitis.  Lab and imaging results discussed with patient.  Recommended admission given electrolyte derangements secondary to diarrhea patient is amenable to plan.  Will admit patient to medical service.

## 2024-09-17 NOTE — H&P ADULT - ATTENDING COMMENTS
58-year-old male PMH HTN presents to the ED complaining of abdominal pain and diarrhea x 1 week. Patient also reports fevers, Tmax 102 yesterday. Endorses poor appetite and 15 pound weight loss over the past week. Patient visited a friend who had diarrhea along with his whole family who reportedly contracted the illness from their mother in a nursing home when they visited her, and symptoms started shortly after. Reports multiple daily episodes of watery diarrhea. Denies black/bloody stools. Denies headache, dizziness, syncope/near syncope, chest pain, shortness of breath, vomiting, dysuria or hematuria.      1. Non-bloody diarrhea secondary to sepsis due to acute colitis  - admit to medicine                  -LA:pending   - Started on cipro and flagyl   - blood cx:pending         - GI PCR:pending          - C.diff:pending   - Bolus LR followed by LR at 100 ml/hr   - replete electrolytes   - CT abdomen pelvis:  a) Mild wall thickening of the wall of sigmoid colon is noted possibly   related to underlying colitis. (3/300) No bowel obstruction, collections   or ascites  b) Unremarkable appendix.    2. Acute Hyperglycemia  - a1c  - SS    3. HTN  - off meds    DVT proph- lovenox  Diet-   Act order- PT  AM labs 58-year-old male PMH HTN presents to the ED complaining of abdominal pain and diarrhea x 1 week. Patient also reports fevers, Tmax 102 yesterday. Endorses poor appetite and 15 pound weight loss over the past week. Patient visited a friend who had diarrhea along with his whole family who reportedly contracted the illness from their mother in a nursing home when they visited her, and symptoms started shortly after. Reports multiple daily episodes of watery diarrhea. Denies black/bloody stools. Denies headache, dizziness, syncope/near syncope, chest pain, shortness of breath, vomiting, dysuria or hematuria.      1. Non-bloody diarrhea secondary to sepsis due to acute colitis  * no recent abs usage but had sick contact. been experiencing 6 days of diarrhea   - admit to medicine                  -LA:pending   - Started on cipro and flagyl   - blood cx:pending         - GI PCR:pending          - C.diff:pending   - Bolus LR followed by LR at 100 ml/hr   - replete electrolytes   - Contact isolation   - CT abdomen pelvis:  a) Mild wall thickening of the wall of sigmoid colon is noted possibly   related to underlying colitis. (3/300) No bowel obstruction, collections   or ascites  b) Unremarkable appendix.    2. Acute Hyperglycemia  - a1c  - SS    3. HTN  - off meds    DVT proph- lovenox  Diet-   Act order- PT  AM labs

## 2024-09-17 NOTE — H&P ADULT - HISTORY OF PRESENT ILLNESS
58-year-old male PMH HTN presents to the ED complaining of abdominal pain and diarrhea x 1 week. Patient also reports fevers, Tmax 102 yesterday. Endorses poor appetite and 15 pound weight loss over the past week. Patient states he visited a friend who had a family member from a nursing home with diarrhea, and symptoms started shortly after. Reports multiple daily episodes of watery diarrhea. Denies black/bloody stools. Denies headache, dizziness, syncope/near syncope, chest pain, shortness of breath, vomiting, dysuria or hematuria.     58-year-old male PMH HTN presents to the ED complaining of abdominal pain and diarrhea x 1 week. Patient also reports fevers, Tmax 102 yesterday. Endorses poor appetite and 15 pound weight loss over the past week. Patient visited a friend who had diarrhea along with his whole family who reportedly contracted the illness from their mother in a nursing home when they visited her, and symptoms started shortly after. Reports multiple daily episodes of watery diarrhea. Denies black/bloody stools. Denies headache, dizziness, syncope/near syncope, chest pain, shortness of breath, vomiting, dysuria or hematuria.    VS all wnl  Labs WBC 15, K- 3.1, Mg 1.6, glucose 210, lipase 70  CT abd   Mild wall thickening of the wall of sigmoid colon is noted possibly   related to underlying colitis. (3/300) No bowel obstruction, collections   or ascites  Unremarkable appendix.  received 1 L LR, IV mag, Potassium

## 2024-09-17 NOTE — ED ADULT NURSE NOTE - OBJECTIVE STATEMENT
Patient is a 58 year old male complaining of abdominal pain, nausea, vomiting, diarrhea and  102 fever x1 week.

## 2024-09-17 NOTE — ED PROVIDER NOTE - CARE PLAN
Principal Discharge DX:	Colitis  Secondary Diagnosis:	Watery diarrhea  Secondary Diagnosis:	Hypomagnesemia  Secondary Diagnosis:	Hypokalemia   1 Consent (Marginal Mandibular)/Introductory Paragraph: The rationale for Mohs was explained to the patient and consent was obtained. The risks, benefits and alternatives to therapy were discussed in detail. Specifically, the risks of damage to the marginal mandibular branch of the facial nerve, infection, scarring, bleeding, prolonged wound healing, incomplete removal, allergy to anesthesia, and recurrence were addressed. Prior to the procedure, the treatment site was clearly identified and confirmed by the patient. All components of Universal Protocol/PAUSE Rule completed.

## 2024-09-18 LAB
A1C WITH ESTIMATED AVERAGE GLUCOSE RESULT: 6.2 % — HIGH (ref 4–5.6)
ANION GAP SERPL CALC-SCNC: 12 MMOL/L — SIGNIFICANT CHANGE UP (ref 7–14)
ANION GAP SERPL CALC-SCNC: 13 MMOL/L — SIGNIFICANT CHANGE UP (ref 7–14)
BUN SERPL-MCNC: 6 MG/DL — LOW (ref 10–20)
BUN SERPL-MCNC: 8 MG/DL — LOW (ref 10–20)
CALCIUM SERPL-MCNC: 8.3 MG/DL — LOW (ref 8.4–10.5)
CALCIUM SERPL-MCNC: 8.4 MG/DL — SIGNIFICANT CHANGE UP (ref 8.4–10.5)
CHLORIDE SERPL-SCNC: 99 MMOL/L — SIGNIFICANT CHANGE UP (ref 98–110)
CHLORIDE SERPL-SCNC: 99 MMOL/L — SIGNIFICANT CHANGE UP (ref 98–110)
CO2 SERPL-SCNC: 27 MMOL/L — SIGNIFICANT CHANGE UP (ref 17–32)
CO2 SERPL-SCNC: 28 MMOL/L — SIGNIFICANT CHANGE UP (ref 17–32)
CREAT SERPL-MCNC: 0.7 MG/DL — SIGNIFICANT CHANGE UP (ref 0.7–1.5)
CREAT SERPL-MCNC: 0.8 MG/DL — SIGNIFICANT CHANGE UP (ref 0.7–1.5)
EGFR: 101 ML/MIN/1.73M2 — SIGNIFICANT CHANGE UP
EGFR: 105 ML/MIN/1.73M2 — SIGNIFICANT CHANGE UP
ESTIMATED AVERAGE GLUCOSE: 131 MG/DL — HIGH (ref 68–114)
GI PCR PANEL: DETECTED
GLUCOSE BLDC GLUCOMTR-MCNC: 114 MG/DL — HIGH (ref 70–99)
GLUCOSE BLDC GLUCOMTR-MCNC: 114 MG/DL — HIGH (ref 70–99)
GLUCOSE BLDC GLUCOMTR-MCNC: 116 MG/DL — HIGH (ref 70–99)
GLUCOSE BLDC GLUCOMTR-MCNC: 124 MG/DL — HIGH (ref 70–99)
GLUCOSE SERPL-MCNC: 108 MG/DL — HIGH (ref 70–99)
GLUCOSE SERPL-MCNC: 113 MG/DL — HIGH (ref 70–99)
HCT VFR BLD CALC: 37.9 % — LOW (ref 42–52)
HGB BLD-MCNC: 13.1 G/DL — LOW (ref 14–18)
LACTATE SERPL-SCNC: 1.7 MMOL/L — SIGNIFICANT CHANGE UP (ref 0.7–2)
LIDOCAIN IGE QN: 72 U/L — HIGH (ref 7–60)
MAGNESIUM SERPL-MCNC: 1.3 MG/DL — LOW (ref 1.8–2.4)
MAGNESIUM SERPL-MCNC: 1.4 MG/DL — LOW (ref 1.8–2.4)
MCHC RBC-ENTMCNC: 28.5 PG — SIGNIFICANT CHANGE UP (ref 27–31)
MCHC RBC-ENTMCNC: 34.6 G/DL — SIGNIFICANT CHANGE UP (ref 32–37)
MCV RBC AUTO: 82.6 FL — SIGNIFICANT CHANGE UP (ref 80–94)
NOROVIRUS GI+II RNA STL QL NAA+NON-PROBE: ABNORMAL
NRBC # BLD: 0 /100 WBCS — SIGNIFICANT CHANGE UP (ref 0–0)
PHOSPHATE SERPL-MCNC: 2.7 MG/DL — SIGNIFICANT CHANGE UP (ref 2.1–4.9)
PLATELET # BLD AUTO: 191 K/UL — SIGNIFICANT CHANGE UP (ref 130–400)
PMV BLD: 10.7 FL — HIGH (ref 7.4–10.4)
POTASSIUM SERPL-MCNC: 2.9 MMOL/L — LOW (ref 3.5–5)
POTASSIUM SERPL-MCNC: 3.6 MMOL/L — SIGNIFICANT CHANGE UP (ref 3.5–5)
POTASSIUM SERPL-SCNC: 2.9 MMOL/L — LOW (ref 3.5–5)
POTASSIUM SERPL-SCNC: 3.6 MMOL/L — SIGNIFICANT CHANGE UP (ref 3.5–5)
RBC # BLD: 4.59 M/UL — LOW (ref 4.7–6.1)
RBC # FLD: 13.2 % — SIGNIFICANT CHANGE UP (ref 11.5–14.5)
SHIGELLA DNA SPEC QL NAA+PROBE: DETECTED
SODIUM SERPL-SCNC: 138 MMOL/L — SIGNIFICANT CHANGE UP (ref 135–146)
SODIUM SERPL-SCNC: 140 MMOL/L — SIGNIFICANT CHANGE UP (ref 135–146)
WBC # BLD: 8.11 K/UL — SIGNIFICANT CHANGE UP (ref 4.8–10.8)
WBC # FLD AUTO: 8.11 K/UL — SIGNIFICANT CHANGE UP (ref 4.8–10.8)

## 2024-09-18 PROCEDURE — 99233 SBSQ HOSP IP/OBS HIGH 50: CPT

## 2024-09-18 RX ORDER — MAGNESIUM SULFATE 500 MG/ML
2 VIAL (ML) INJECTION EVERY 4 HOURS
Refills: 0 | Status: COMPLETED | OUTPATIENT
Start: 2024-09-18 | End: 2024-09-18

## 2024-09-18 RX ORDER — ACETAMINOPHEN 325 MG
650 TABLET ORAL EVERY 6 HOURS
Refills: 0 | Status: DISCONTINUED | OUTPATIENT
Start: 2024-09-18 | End: 2024-09-22

## 2024-09-18 RX ADMIN — Medication 25 GRAM(S): at 21:16

## 2024-09-18 RX ADMIN — Medication 100 MILLILITER(S): at 10:46

## 2024-09-18 RX ADMIN — Medication 50 MILLIEQUIVALENT(S): at 12:28

## 2024-09-18 RX ADMIN — Medication 200 MILLIGRAM(S): at 05:07

## 2024-09-18 RX ADMIN — Medication 25 GRAM(S): at 13:10

## 2024-09-18 RX ADMIN — Medication 40 MILLIEQUIVALENT(S): at 21:18

## 2024-09-18 RX ADMIN — Medication 25 GRAM(S): at 17:29

## 2024-09-18 RX ADMIN — Medication 100 MILLILITER(S): at 17:28

## 2024-09-18 RX ADMIN — Medication 200 MILLIGRAM(S): at 17:28

## 2024-09-18 RX ADMIN — Medication 650 MILLIGRAM(S): at 06:19

## 2024-09-18 RX ADMIN — Medication 500 MILLIGRAM(S): at 21:19

## 2024-09-18 RX ADMIN — Medication 40 MILLIEQUIVALENT(S): at 10:47

## 2024-09-18 RX ADMIN — Medication 25 GRAM(S): at 23:40

## 2024-09-18 RX ADMIN — Medication 25 GRAM(S): at 14:05

## 2024-09-18 RX ADMIN — Medication 650 MILLIGRAM(S): at 05:04

## 2024-09-18 RX ADMIN — ENOXAPARIN SODIUM 40 MILLIGRAM(S): 150 INJECTION SUBCUTANEOUS at 21:17

## 2024-09-18 RX ADMIN — Medication 25 GRAM(S): at 10:47

## 2024-09-18 RX ADMIN — Medication 50 MILLIEQUIVALENT(S): at 10:46

## 2024-09-18 RX ADMIN — Medication 500 MILLIGRAM(S): at 05:04

## 2024-09-18 RX ADMIN — Medication 500 MILLIGRAM(S): at 13:10

## 2024-09-18 NOTE — PROGRESS NOTE ADULT - ASSESSMENT
58 year old male with a PMHx of HTN presents to the ED complaining of abdominal pain and watery non-bloody diarrhea for 1 week duration.     Suspected infectious diarrhoea  Infectious colitis  Hypokalemia  Hypomagnesemia        GI losses with electrolyte abnormalities  CT shows Mild wall thickening of the wall of sigmoid colon is noted possibly   related to underlying colitis  IV hydration  Potassium & Mg supplemented  Keep monitoring K and Mg  C diff negative  Follow GI PCR  Empirically cover with cipro & flagyl  Resume home meds including amlodipine , statin and ASA  Sliding scale coverage while poor po intake.   Follow A1c  DVT proph- Lovenox  Disposition: remains medically active for now

## 2024-09-18 NOTE — PROGRESS NOTE ADULT - SUBJECTIVE AND OBJECTIVE BOX
Subjective/Overnight events:  58 year old male with a PMHx of HTN presents to the ED complaining of abdominal pain and diarrhea for the past week. Pt also reports fevers, Tmax 102 on 09/16. endorses poor appetite and 15 pound weight loss over the past week. pt visited a friend who had diarrhea along with his whole family who reportedly contracted the illness from their mother in a nursing home when they visited her, and symptoms started shortly after. Reports 5-6 episodes of watery non-bloody diarrhea daily, also endorses bowel incontinence. Denies black/bloody stools. Denies headache, dizziness, chest pain, shortness of breath, and vomiting.    ED    VS all wnl  Labs WBC 15, K- 3.1, Mg 1.6, glucose 210, lipase 70  CT abd   Mild wall thickening of the wall of sigmoid colon is noted possibly   related to underlying colitis. (3/300) No bowel obstruction, collections   or ascites  Unremarkable appendix.  received 1 L LR, IV mag, Potassium    Past Medical History:  HTN      Allergies:  No Known Allergies    Social Hx:   Drug use    Alcohol abuse    Medications:    Standing:  Ciprofloxacin IVPB - 400milliGRAM(s) IV intermittent every 12 hours Infuse over 60 minute(s)  MetroNIDAZOLE tablet - 500milliGRAM(s) oral every 8 hours  enoxaparin injectable -  40milliGRAM(s) subcutaneous every 24hrs  Insulin lispro (ADMELOG) corrective regimen sliding scale - subcutaneous 3x a day before meals    Home:  Aspirin Low Dose 81 mg oral delayed release tablet: 1 tab(s) orally once a day   Atorvastatin 20 mg oral tablet: 1 tab(s) orally once a day   Thiamine 100 mg oral tablet: 1 tab(s) orally once a day  Folic acid 1 mg oral tablet: 1 tab(s) orally once a day  Lactobacillus acidophilus oral capsule: 1 caplet(s) orally 1 to 2 times a day   Clindamycin 150 mg oral capsule: 3 cap(s) orally 3 times a day - 11/18 - 11/27  Amlodipine 5 mg oral tablet: 1 tab(s) orally once a day      Vitals:  Vital Signs Last 24 Hrs  T(C): 37.1 (18 Sep 2024 06:14), Max: 38.9 (17 Sep 2024 19:06)  T(F): 98.8 (18 Sep 2024 06:14), Max: 102 (17 Sep 2024 19:06)  HR: 91 (18 Sep 2024 06:14) (86 - 109)  BP: 137/77 (18 Sep 2024 06:14) (123/68 - 149/75)  BP(mean): --  RR: 18 (18 Sep 2024 06:14) (18 - 18)  SpO2: 96% (18 Sep 2024 06:14) (95% - 100%)    Parameters below as of 18 Sep 2024 04:56  Patient On (Oxygen Delivery Method): room air        Physical Exam:  General: Awake, alert, NAD, on RA  HEENT: Atraumatic Normocephalic   Heart: Normal S1S2, RRR, no rubs, murmurs appreciated  Lungs: Clear to auscultation bilaterally, no wheezing, rales or rhonchi  Abdomen: Soft, nondistended, RLQ tenderness to palpation  Extremities: No lower extremity swelling, bilateral dorsalis pedis pulses 2+  Neuro: AOx3, NFD    Labs:                        13.1   8.11  )-----------( 191      ( 18 Sep 2024 07:10 )             37.9     09-18    138  |  99  |  6[L]  ----------------------------<  108[H]  2.9[L]   |  27  |  0.8    Ca    8.3[L]      18 Sep 2024 07:10  Phos  2.7     09-18  Mg     1.3     09-18    TPro  5.8[L]  /  Alb  3.6  /  TBili  0.5  /  DBili  x   /  AST  15  /  ALT  36  /  AlkPhos  95  09-17     SUBJECTIVE/OVERNIGHT EVENTS  Today is hospital day 1d. This morning patient was seen and examined at bedside, resting comfortably in bed. No acute or major events overnight. Pt endorses soft/watery nonbloody diarrhea 4-6 times a day that started 1 week ago. He endorses bowel incontinence. Pt reports abdominal pain that is diffuse, sharp, and constant. Pt endorses nausea without vomiting. Pt denies CP, SOB, HA.    MEDICATIONS  STANDING MEDICATIONS  ciprofloxacin   IVPB      ciprofloxacin   IVPB 400 milliGRAM(s) IV Intermittent every 12 hours  dextrose 5%. 1000 milliLiter(s) IV Continuous <Continuous>  dextrose 5%. 1000 milliLiter(s) IV Continuous <Continuous>  dextrose 50% Injectable 25 Gram(s) IV Push once  dextrose 50% Injectable 12.5 Gram(s) IV Push once  dextrose 50% Injectable 25 Gram(s) IV Push once  enoxaparin Injectable 40 milliGRAM(s) SubCutaneous every 24 hours  glucagon  Injectable 1 milliGRAM(s) IntraMuscular once  insulin lispro (ADMELOG) corrective regimen sliding scale   SubCutaneous three times a day before meals  lactated ringers. 1000 milliLiter(s) IV Continuous <Continuous>  magnesium sulfate  IVPB 2 Gram(s) IV Intermittent every 4 hours  metroNIDAZOLE    Tablet 500 milliGRAM(s) Oral every 8 hours    PRN MEDICATIONS  acetaminophen     Tablet .. 650 milliGRAM(s) Oral every 6 hours PRN  dextrose Oral Gel 15 Gram(s) Oral once PRN  ondansetron Injectable 4 milliGRAM(s) IV Push every 6 hours PRN    VITALS  T(F): 98.1 (09-18-24 @ 12:04), Max: 102 (09-17-24 @ 19:06)  HR: 80 (09-18-24 @ 12:04) (80 - 109)  BP: 150/81 (09-18-24 @ 12:04) (123/68 - 150/81)  RR: 18 (09-18-24 @ 12:04) (18 - 18)  SpO2: 98% (09-18-24 @ 12:04) (95% - 100%)  POCT Blood Glucose.: 114 mg/dL (09-18-24 @ 11:11)  POCT Blood Glucose.: 124 mg/dL (09-18-24 @ 07:58)  POCT Blood Glucose.: 97 mg/dL (09-17-24 @ 22:03)  POCT Blood Glucose.: 136 mg/dL (09-17-24 @ 17:52)    PHYSICAL EXAM  General: Awake, alert, NAD, on RA  HEENT: Atraumatic Normocephalic   Heart: Normal S1S2, RRR, no rubs, murmurs appreciated  Lungs: Clear to auscultation bilaterally, no wheezing, rales or rhonchi  Abdomen: Soft, nondistended, RLQ tenderness to palpation  Extremities: No lower extremity swelling, bilateral dorsalis pedis pulses 2+  Neuro: AOx4, NFD    LABS             13.1   8.11  )-----------( 191      ( 09-18-24 @ 07:10 )             37.9     140  |  99  |  8   -------------------------<  113   09-18-24 @ 11:23  3.6  |  28  |  0.7    Ca      8.4     09-18-24 @ 11:23  Phos   2.7     09-18-24 @ 07:10  Mg     1.4     09-18-24 @ 11:23    TPro  5.8  /  Alb  3.6  /  TBili  0.5  /  DBili  x   /  AST  15  /  ALT  36  /  AlkPhos  95  /  GGT  x     09-17-24 @ 09:45        Urinalysis Basic - ( 18 Sep 2024 11:23 )    Color: x / Appearance: x / SG: x / pH: x  Gluc: 113 mg/dL / Ketone: x  / Bili: x / Urobili: x   Blood: x / Protein: x / Nitrite: x   Leuk Esterase: x / RBC: x / WBC x   Sq Epi: x / Non Sq Epi: x / Bacteria: x

## 2024-09-18 NOTE — PATIENT PROFILE ADULT - FALL HARM RISK - HARM RISK INTERVENTIONS
Assistance with ambulation/Assistance OOB with selected safe patient handling equipment/Communicate Risk of Fall with Harm to all staff/Reinforce activity limits and safety measures with patient and family/Review medications for side effects contributing to fall risk/Sit up slowly, dangle for a short time, stand at bedside before walking/Tailored Fall Risk Interventions/Toileting schedule using arm’s reach rule for commode and bathroom/Visual Cue: Yellow wristband and red socks/Bed in lowest position, wheels locked, appropriate side rails in place/Call bell, personal items and telephone in reach/Instruct patient to call for assistance before getting out of bed or chair/Non-slip footwear when patient is out of bed/[place] to call system/Physically safe environment - no spills, clutter or unnecessary equipment/Purposeful Proactive Rounding/Room/bathroom lighting operational, light cord in reach

## 2024-09-18 NOTE — PROGRESS NOTE ADULT - ASSESSMENT
58 year old male with a PMHx of HTN presents to the ED complaining of abdominal pain and watery non-bloody diarrhea for 1 weeks duration.     #Non-bloody diarrhea  #Sigmoid Colitis  - GI PCR  - C Diff - negative  - cipro, flagyl  - replete electrolytes   - loperamide for diarrhea     #Hyperglycemia  - a1c  - SS  -POCT glucose 114 trending down    #HTN  - off meds    #Hypokalemia  - replete electrolytes  - KCl powder 40mEq oral  - lactated ringers - 1000milliliter(s) IV continuous infuse over 10 hours  - follow K+    #Hypomagnesemia  - Magnesium sulfate IVPB    DVT proph- lovenox  Diet-   Act order- PT  AM labs 58 year old male with a PMHx of HTN presents to the ED complaining of abdominal pain and watery non-bloody diarrhea for 1 weeks duration.     #Non-bloody diarrhea  #Sigmoid Colitis  - GI PCR - positive, shigella/enteroinvasive e. coli  - C Diff - negative  - c/w cipro, flagyl  - monitor and replete electrolytes     #Hyperglycemia  - a1c pending  - SSI  - POCT glucose 114 trending down    #HTN  - off meds    #Hypokalemia  - replete electrolytes  - KCl powder 40mEq oral  - lactated ringers - 1000milliliter(s) IV continuous infuse over 10 hours  - follow K+    #Hypomagnesemia  - Magnesium sulfate IVPB    DVT proph- lovenox  Diet-   Act order- PT  AM labs      #DVT prophylaxis: Lovenox  #GI prophylaxis: PPI  #Diet: DASH/TLC, CC  #Activity: AAT  #Code status: Full Code  #Disposition: TBD    #Pending: A1c   58 year old male with a PMHx of HTN presents to the ED complaining of abdominal pain and watery non-bloody diarrhea for 1 weeks duration.     #Non-bloody diarrhea  #Sigmoid Colitis  #GI losses with electrolyte abnormalities  - 9/17 CT AP - Mild wall thickening of the wall of sigmoid colon is noted possibly related to underlying colitis. (3/300) No bowel obstruction, collections or ascites. Unremarkable appendix.  -  GI PCR - positive, shigella/enteroinvasive e. coli  - C Diff - negative  - c/w cipro, flagyl  - monitor and replete electrolytes (K, Mg)    #Hyperglycemia  - a1c pending  - SSI  - monitor FS    #HTN  - resume home meds - amlodipine 5mg daily, statin, ASA      #DVT prophylaxis: Lovenox  #GI prophylaxis: PPI  #Diet: DASH/TLC, CC  #Activity: AAT  #Code status: Full Code  #Disposition: TBD    #Pending: A1c, monitor electrolytes   58 year old male with a PMHx of HTN presents to the ED complaining of abdominal pain and watery non-bloody diarrhea for 1 weeks duration.     #Non-bloody diarrhea  #Sigmoid Colitis  #GI losses with electrolyte abnormalities  - 9/17 CT AP - Mild wall thickening of the wall of sigmoid colon is noted possibly related to underlying colitis. (3/300) No bowel obstruction, collections or ascites. Unremarkable appendix.  -  GI PCR - positive, shigella/enteroinvasive e. coli  - C Diff - negative  - c/w cipro, flagyl  - monitor and replete electrolytes (K, Mg)    #Hyperglycemia  - A1C pending  - SSI  - monitor FS    #HTN  - resume home meds - amlodipine 5mg daily, statin, ASA      #DVT prophylaxis: Lovenox  #GI prophylaxis: PPI  #Diet: DASH/TLC, CC  #Activity: AAT  #Code status: Full Code  #Disposition: TBD    #Pending: A1c, monitor electrolytes

## 2024-09-18 NOTE — PROGRESS NOTE ADULT - SUBJECTIVE AND OBJECTIVE BOX
Progress Note:  Provider Speciality                            Hospitalist      YENNY VILLEDA MRN-[id number] 58y Male     CHIEF PRESENTING COMPLAINT:  Patient is a 58y old  Male who presents with a chief complaint of colitis (18 Sep 2024 11:13)        SUBJECTIVE:  Patient was seen and examined at bedside. Reports continued diarrhoea  No significant overnight events reported.     HISTORY OF PRESENTING ILLNESS:  HPI:  58-year-old male PMH HTN presents to the ED complaining of abdominal pain and diarrhea x 1 week. Patient also reports fevers, Tmax 102 yesterday. Endorses poor appetite and 15 pound weight loss over the past week. Patient visited a friend who had diarrhea along with his whole family who reportedly contracted the illness from their mother in a nursing home when they visited her, and symptoms started shortly after. Reports multiple daily episodes of watery diarrhea. Denies black/bloody stools. Denies headache, dizziness, syncope/near syncope, chest pain, shortness of breath, vomiting, dysuria or hematuria.    VS all wnl  Labs WBC 15, K- 3.1, Mg 1.6, glucose 210, lipase 70  CT abd   Mild wall thickening of the wall of sigmoid colon is noted possibly   related to underlying colitis. (3/300) No bowel obstruction, collections   or ascites  Unremarkable appendix.  received 1 L LR, IV mag, Potassium     (17 Sep 2024 15:06)        REVIEW OF SYSTEMS:  Patient denies  headache, fever, chills. Denies chest pain, shortness of breath, palpitation. Denies nausea, vomiting, Denies dysuria.   At least 10 systems were reviewed in ROS. All systems reviewed  are within normal limits except for the complaints as described in Subjective.    PAST MEDICAL & SURGICAL HISTORY:  PAST MEDICAL & SURGICAL HISTORY:  Alcohol abuse      Drug use      No significant past surgical history              VITAL SIGNS:  Vital Signs Last 24 Hrs  T(C): 36.7 (18 Sep 2024 12:04), Max: 38.9 (17 Sep 2024 19:06)  T(F): 98.1 (18 Sep 2024 12:04), Max: 102 (17 Sep 2024 19:06)  HR: 80 (18 Sep 2024 12:04) (80 - 109)  BP: 150/81 (18 Sep 2024 12:04) (123/68 - 150/81)  BP(mean): --  RR: 18 (18 Sep 2024 12:04) (18 - 18)  SpO2: 98% (18 Sep 2024 12:04) (95% - 100%)    Parameters below as of 18 Sep 2024 04:56  Patient On (Oxygen Delivery Method): room air              PHYSICAL EXAMINATION:  Not in acute distress  General: No icterus  HEENT:   no JVD.  Heart: S1+S2 audible  Lungs: bilateral  moderate air entry, no wheezing, no crepitations.  Abdomen: Soft, non-tender, non-distended , no  rigidity or guarding.  CNS: Awake alert, CN  grossly intact.  Extremities:  No edema            CONSULTS:  Consultant(s) Notes Reviewed by me.   Care Discussed with Consultants/Other Providers where required.    All the images and labs were reviewed today        MEDICATIONS:  MEDICATIONS  (STANDING):  ciprofloxacin   IVPB      ciprofloxacin   IVPB 400 milliGRAM(s) IV Intermittent every 12 hours  dextrose 5%. 1000 milliLiter(s) (50 mL/Hr) IV Continuous <Continuous>  dextrose 5%. 1000 milliLiter(s) (100 mL/Hr) IV Continuous <Continuous>  dextrose 50% Injectable 25 Gram(s) IV Push once  dextrose 50% Injectable 12.5 Gram(s) IV Push once  dextrose 50% Injectable 25 Gram(s) IV Push once  enoxaparin Injectable 40 milliGRAM(s) SubCutaneous every 24 hours  glucagon  Injectable 1 milliGRAM(s) IntraMuscular once  insulin lispro (ADMELOG) corrective regimen sliding scale   SubCutaneous three times a day before meals  lactated ringers. 1000 milliLiter(s) (100 mL/Hr) IV Continuous <Continuous>  magnesium sulfate  IVPB 2 Gram(s) IV Intermittent every 4 hours  metroNIDAZOLE    Tablet 500 milliGRAM(s) Oral every 8 hours    MEDICATIONS  (PRN):  acetaminophen     Tablet .. 650 milliGRAM(s) Oral every 6 hours PRN Temp greater or equal to 38.5C (101.3F)  dextrose Oral Gel 15 Gram(s) Oral once PRN Blood Glucose LESS THAN 70 milliGRAM(s)/deciliter  ondansetron Injectable 4 milliGRAM(s) IV Push every 6 hours PRN Nausea

## 2024-09-19 ENCOUNTER — TRANSCRIPTION ENCOUNTER (OUTPATIENT)
Age: 59
End: 2024-09-19

## 2024-09-19 LAB
ALBUMIN SERPL ELPH-MCNC: 2.8 G/DL — LOW (ref 3.5–5.2)
ALP SERPL-CCNC: 83 U/L — SIGNIFICANT CHANGE UP (ref 30–115)
ALT FLD-CCNC: 37 U/L — SIGNIFICANT CHANGE UP (ref 0–41)
ANION GAP SERPL CALC-SCNC: 11 MMOL/L — SIGNIFICANT CHANGE UP (ref 7–14)
APPEARANCE UR: CLEAR — SIGNIFICANT CHANGE UP
AST SERPL-CCNC: 20 U/L — SIGNIFICANT CHANGE UP (ref 0–41)
BASOPHILS # BLD AUTO: 0.02 K/UL — SIGNIFICANT CHANGE UP (ref 0–0.2)
BASOPHILS NFR BLD AUTO: 0.4 % — SIGNIFICANT CHANGE UP (ref 0–1)
BILIRUB SERPL-MCNC: 0.4 MG/DL — SIGNIFICANT CHANGE UP (ref 0.2–1.2)
BILIRUB UR-MCNC: NEGATIVE — SIGNIFICANT CHANGE UP
BUN SERPL-MCNC: 5 MG/DL — LOW (ref 10–20)
CALCIUM SERPL-MCNC: 7.6 MG/DL — LOW (ref 8.4–10.4)
CHLORIDE SERPL-SCNC: 95 MMOL/L — LOW (ref 98–110)
CO2 SERPL-SCNC: 28 MMOL/L — SIGNIFICANT CHANGE UP (ref 17–32)
COLOR SPEC: YELLOW — SIGNIFICANT CHANGE UP
CREAT SERPL-MCNC: 0.7 MG/DL — SIGNIFICANT CHANGE UP (ref 0.7–1.5)
DIFF PNL FLD: NEGATIVE — SIGNIFICANT CHANGE UP
EGFR: 105 ML/MIN/1.73M2 — SIGNIFICANT CHANGE UP
EOSINOPHIL # BLD AUTO: 0.01 K/UL — SIGNIFICANT CHANGE UP (ref 0–0.7)
EOSINOPHIL NFR BLD AUTO: 0.2 % — SIGNIFICANT CHANGE UP (ref 0–8)
GLUCOSE BLDC GLUCOMTR-MCNC: 136 MG/DL — HIGH (ref 70–99)
GLUCOSE SERPL-MCNC: 146 MG/DL — HIGH (ref 70–99)
GLUCOSE UR QL: NEGATIVE MG/DL — SIGNIFICANT CHANGE UP
HCT VFR BLD CALC: 34.4 % — LOW (ref 42–52)
HGB BLD-MCNC: 11.9 G/DL — LOW (ref 14–18)
IMM GRANULOCYTES NFR BLD AUTO: 1.3 % — HIGH (ref 0.1–0.3)
KETONES UR-MCNC: NEGATIVE MG/DL — SIGNIFICANT CHANGE UP
LEUKOCYTE ESTERASE UR-ACNC: NEGATIVE — SIGNIFICANT CHANGE UP
LYMPHOCYTES # BLD AUTO: 1.08 K/UL — LOW (ref 1.2–3.4)
LYMPHOCYTES # BLD AUTO: 19.5 % — LOW (ref 20.5–51.1)
MAGNESIUM SERPL-MCNC: 1.8 MG/DL — SIGNIFICANT CHANGE UP (ref 1.8–2.4)
MCHC RBC-ENTMCNC: 28.5 PG — SIGNIFICANT CHANGE UP (ref 27–31)
MCHC RBC-ENTMCNC: 34.6 G/DL — SIGNIFICANT CHANGE UP (ref 32–37)
MCV RBC AUTO: 82.5 FL — SIGNIFICANT CHANGE UP (ref 80–94)
MONOCYTES # BLD AUTO: 0.5 K/UL — SIGNIFICANT CHANGE UP (ref 0.1–0.6)
MONOCYTES NFR BLD AUTO: 9 % — SIGNIFICANT CHANGE UP (ref 1.7–9.3)
NEUTROPHILS # BLD AUTO: 3.87 K/UL — SIGNIFICANT CHANGE UP (ref 1.4–6.5)
NEUTROPHILS NFR BLD AUTO: 69.6 % — SIGNIFICANT CHANGE UP (ref 42.2–75.2)
NITRITE UR-MCNC: NEGATIVE — SIGNIFICANT CHANGE UP
NRBC # BLD: 0 /100 WBCS — SIGNIFICANT CHANGE UP (ref 0–0)
PH UR: 6 — SIGNIFICANT CHANGE UP (ref 5–8)
PLATELET # BLD AUTO: 177 K/UL — SIGNIFICANT CHANGE UP (ref 130–400)
PMV BLD: 10.4 FL — SIGNIFICANT CHANGE UP (ref 7.4–10.4)
POTASSIUM SERPL-MCNC: 3 MMOL/L — LOW (ref 3.5–5)
POTASSIUM SERPL-SCNC: 3 MMOL/L — LOW (ref 3.5–5)
PROT SERPL-MCNC: 4.9 G/DL — LOW (ref 6–8)
PROT UR-MCNC: NEGATIVE MG/DL — SIGNIFICANT CHANGE UP
RBC # BLD: 4.17 M/UL — LOW (ref 4.7–6.1)
RBC # FLD: 13.1 % — SIGNIFICANT CHANGE UP (ref 11.5–14.5)
SODIUM SERPL-SCNC: 134 MMOL/L — LOW (ref 135–146)
SP GR SPEC: 1.01 — SIGNIFICANT CHANGE UP (ref 1–1.03)
UROBILINOGEN FLD QL: 0.2 MG/DL — SIGNIFICANT CHANGE UP (ref 0.2–1)
WBC # BLD: 5.55 K/UL — SIGNIFICANT CHANGE UP (ref 4.8–10.8)
WBC # FLD AUTO: 5.55 K/UL — SIGNIFICANT CHANGE UP (ref 4.8–10.8)

## 2024-09-19 PROCEDURE — 99232 SBSQ HOSP IP/OBS MODERATE 35: CPT

## 2024-09-19 RX ORDER — AMLODIPINE BESYLATE 5 MG
1 TABLET ORAL
Qty: 30 | Refills: 1
Start: 2024-09-19 | End: 2024-11-17

## 2024-09-19 RX ORDER — AZITHROMYCIN 250 MG/1
500 TABLET, FILM COATED ORAL DAILY
Refills: 0 | Status: DISCONTINUED | OUTPATIENT
Start: 2024-09-19 | End: 2024-09-21

## 2024-09-19 RX ADMIN — ENOXAPARIN SODIUM 40 MILLIGRAM(S): 150 INJECTION SUBCUTANEOUS at 21:12

## 2024-09-19 RX ADMIN — Medication 500 MILLIGRAM(S): at 06:13

## 2024-09-19 RX ADMIN — Medication 40 MILLIEQUIVALENT(S): at 11:48

## 2024-09-19 RX ADMIN — Medication 100 MILLILITER(S): at 03:03

## 2024-09-19 RX ADMIN — AZITHROMYCIN 500 MILLIGRAM(S): 250 TABLET, FILM COATED ORAL at 15:40

## 2024-09-19 RX ADMIN — Medication 650 MILLIGRAM(S): at 14:20

## 2024-09-19 RX ADMIN — Medication 100 MILLILITER(S): at 21:13

## 2024-09-19 RX ADMIN — Medication 40 MILLIEQUIVALENT(S): at 14:20

## 2024-09-19 RX ADMIN — Medication 200 MILLIGRAM(S): at 06:13

## 2024-09-19 NOTE — PROGRESS NOTE ADULT - SUBJECTIVE AND OBJECTIVE BOX
Patient seen and examined. Events over the last 24 hours noted.   Pt said he feels much better and wanted to go home, however he developed low grade fever to 100.6 at noon    T(F): 100.6 (09-19-24 @ 12:15), Max: 100.6 (09-19-24 @ 12:15)  HR: 89 (09-19-24 @ 12:15)  BP: 129/71 (09-19-24 @ 12:15)  RR: 18 (09-19-24 @ 12:15)  SpO2: 98% (09-19-24 @ 12:15) (96% - 99%)    PHYSICAL EXAM:  GEN: No acute distress  HEENT: normocephalic, atraumatic, anicteric  LUNGS: b/l breath sounds present, clear to auscultation bilaterally   HEART: S1/S2 present. RRR  ABD: Soft, non-tender, non-distended. Bowel sounds present  EXT: warm   NEURO: awake, no new focal deficits    LABS  09-19    134[L]  |  95[L]  |  5[L]  ----------------------------<  146[H]  3.0[L]   |  28  |  0.7    Ca    7.6[L]      19 Sep 2024 07:30  Phos  2.7     09-18  Mg     1.8     09-19    TPro  4.9[L]  /  Alb  2.8[L]  /  TBili  0.4  /  DBili  x   /  AST  20  /  ALT  37  /  AlkPhos  83  09-19                          11.9   5.55  )-----------( 177      ( 19 Sep 2024 07:30 )             34.4            MEDICATIONS  (STANDING):  ciprofloxacin   IVPB      ciprofloxacin   IVPB 400 milliGRAM(s) IV Intermittent every 12 hours  dextrose 5%. 1000 milliLiter(s) (50 mL/Hr) IV Continuous <Continuous>  dextrose 5%. 1000 milliLiter(s) (100 mL/Hr) IV Continuous <Continuous>  dextrose 50% Injectable 25 Gram(s) IV Push once  dextrose 50% Injectable 12.5 Gram(s) IV Push once  dextrose 50% Injectable 25 Gram(s) IV Push once  enoxaparin Injectable 40 milliGRAM(s) SubCutaneous every 24 hours  glucagon  Injectable 1 milliGRAM(s) IntraMuscular once  insulin lispro (ADMELOG) corrective regimen sliding scale   SubCutaneous three times a day before meals  lactated ringers. 1000 milliLiter(s) (100 mL/Hr) IV Continuous <Continuous>  metroNIDAZOLE    Tablet 500 milliGRAM(s) Oral every 8 hours  potassium chloride   Powder 40 milliEquivalent(s) Oral every 2 hours    MEDICATIONS  (PRN):  acetaminophen     Tablet .. 650 milliGRAM(s) Oral every 6 hours PRN Temp greater or equal to 38.5C (101.3F)  dextrose Oral Gel 15 Gram(s) Oral once PRN Blood Glucose LESS THAN 70 milliGRAM(s)/deciliter  ondansetron Injectable 4 milliGRAM(s) IV Push every 6 hours PRN Nausea

## 2024-09-19 NOTE — DISCHARGE NOTE PROVIDER - NSDCFUSCHEDAPPT_GEN_ALL_CORE_FT
Alejandro Vega  [facility]  Scheduled Appointment: 10/29/2024    Alejandro Vega  [facility]  [address]  Scheduled Appointment: 10/29/2024

## 2024-09-19 NOTE — DISCHARGE NOTE PROVIDER - CARE PROVIDER_API CALL
Alejandro Vega  Internal Medicine  [address] [place] [id number]  Phone: [phone]  Fax: [phone]  Scheduled Appointment: 10/29/2024 02:00 PM

## 2024-09-19 NOTE — DISCHARGE NOTE PROVIDER - YES NO FOR MLM POSITIVE OR NEGATIVE COVID RESULT
Lisa Salinas  [address]  [facility] [address]        Dear Ms Kathleen Chilel    Your test for COVID-19 is negative (no evidence of infection). Please feel free to contact me with any further questions or concerns. Sincerely,  Cheng Joseph ,

## 2024-09-19 NOTE — DISCHARGE NOTE PROVIDER - NSDCMRMEDTOKEN_GEN_ALL_CORE_FT
amLODIPine 5 mg oral tablet: 1 tab(s) orally once a day  Cipro 500 mg oral tablet: 1 tab(s) orally 2 times a day   amLODIPine 5 mg oral tablet: 1 tab(s) orally once a day  cefpodoxime 200 mg oral tablet: 1 tab(s) orally 2 times a day

## 2024-09-19 NOTE — PROGRESS NOTE ADULT - ASSESSMENT
58 year old male with a PMHx of HTN presents to the ED complaining of abdominal pain and watery non-bloody diarrhea for 1 week duration.     E.Coli/shigella enteritis  Infectious colitis  Hypokalemia  Hypomagnesemia      GI losses with electrolyte abnormalities  CT shows Mild wall thickening of the wall of sigmoid colon is noted possibly   related to underlying colitis  IV hydration  Potassium & Mg supplemented  Keep monitoring K and Mg  C diff negative  Cont IV cipro flagyl  ID consult due to persistent fever   Resume home meds including amlodipine , statin and ASA  Sliding scale coverage while poor po intake.   Follow A1c  DVT proph- Lovenox  Disposition: remains medically active for now d/t fever 58 year old male with a PMHx of HTN presents to the ED complaining of abdominal pain and watery non-bloody diarrhea for 1 week duration.     E.Coli/shigella enteritis  Infectious colitis  Hypokalemia  Hypomagnesemia  Sepsis ruled in      GI losses with electrolyte abnormalities  CT shows Mild wall thickening of the wall of sigmoid colon is noted possibly   related to underlying colitis  IV hydration  Potassium & Mg supplemented  Keep monitoring K and Mg  C diff negative  Cont IV cipro flagyl  ID consult due to persistent fever   Resume home meds including amlodipine , statin and ASA  Sliding scale coverage while poor po intake.   Follow A1c  DVT proph- Lovenox  Disposition: remains medically active for now d/t fever

## 2024-09-19 NOTE — CDI QUERY NOTE - NSCDIOTHERTXTBX_GEN_ALL_CORE_HH
Clinical documentation and/or evidence in the medical record indicates that this patient has sepsis.  In order to ensure accurate coding and accuracy of the clinical record, the documentation in this patient’s medical record requires additional clarification.      Please include more specific documentation as to the type/status of sepsis in your progress note and/or discharge summary.       Please clarify if the patient was found to have:         •	Sepsis ruled in        •	Sepsis ruled out after study                                                                         •	Other (specify)      Supporting documentation and/or clinical evidence:     9/17 1906 CPOE: Vital signs: 102.0  99    9/17 H&P Adult: ... 58-year-old male PMH HTN presents to the ED complaining of abdominal pain and diarrhea x 1 week. Patient also reports fevers, Tmax 102 yesterday. Endorses poor appetite and 15 pound weight loss over the past week. Patient visited a friend who had diarrhea along with his whole family who reportedly contracted the illness from their mother in a nursing home when they visited her, and symptoms started shortly after. Reports multiple daily episodes of watery diarrhea; ... Non-bloody diarrhea secondary to sepsis due to acute colitis    DC Note Provider: #Non-bloody diarrhea #Sigmoid Colitis #GI losses with electrolyte abnormalities... Stool testing was positive for shigella and enteroinvasive e. coli    Diagnostics: WBC Count: 8.11 K/uL (09.18.24 @ 07:10)   WBC Count: 14.97 K/uL (09.17.24 @ 09:45)     Treatment: cipro, and flagyl     For reference, please see the [facility] sepsis ED sepsis/severe sepsis management algorithm located on the [facility] Intranet

## 2024-09-19 NOTE — DISCHARGE NOTE PROVIDER - HOSPITAL COURSE
58-year-old male PMH HTN presents to the ED complaining of abdominal pain and diarrhea x 1 week. Patient also reports fevers, Tmax 102 yesterday. Endorses poor appetite and 15 pound weight loss over the past week. Patient visited a friend who had diarrhea along with his whole family who reportedly contracted the illness from their mother in a nursing home when they visited her, and symptoms started shortly after. Reports multiple daily episodes of watery diarrhea. Denies black/bloody stools. Denies headache, dizziness, syncope/near syncope, chest pain, shortness of breath, vomiting, dysuria or hematuria.    VS all wnl  Labs WBC 15, K- 3.1, Mg 1.6, glucose 210, lipase 70  CT abd   Mild wall thickening of the wall of sigmoid colon is noted possibly   related to underlying colitis. (3/300) No bowel obstruction, collections   or ascites  Unremarkable appendix.  received 1 L LR, IV mag, Potassium    Discussion of discharge plan of care, including discharge diagnoses, medication reconciliation, and follow-ups was conducted with Dr. Moreno on 9/19/2024, and discharge was approved.    -------    58 year old male with a PMHx of HTN presents to the ED complaining of abdominal pain and watery non-bloody diarrhea for 1 weeks duration.     #Non-bloody diarrhea  #Sigmoid Colitis  #GI losses with electrolyte abnormalities  - 9/17 CT AP - Mild wall thickening of the wall of sigmoid colon is noted possibly related to underlying colitis. (3/300) No bowel obstruction, collections or ascites. Unremarkable appendix.  -  GI PCR - positive, shigella/enteroinvasive e. coli  - C Diff - negative  - c/w cipro, flagyl  - monitor and replete electrolytes (K, Mg)    #Hyperglycemia  - A1C pending  - SSI  - monitor FS    #HTN  - resume home meds - amlodipine 5mg daily, statin, ASA      #DVT prophylaxis: Lovenox  #GI prophylaxis: PPI  #Diet: DASH/TLC, CC  #Activity: AAT  #Code status: Full Code  #Disposition: TBD 58-year-old male PMH HTN presents to the ED complaining of abdominal pain and diarrhea x 1 week. Patient also reports fevers, Tmax 102 yesterday. Endorses poor appetite and 15 pound weight loss over the past week. Patient visited a friend who had diarrhea along with his whole family who reportedly contracted the illness from their mother in a nursing home when they visited her, and symptoms started shortly after. Reports multiple daily episodes of watery diarrhea. Denies black/bloody stools. Denies headache, dizziness, syncope/near syncope, chest pain, shortness of breath, vomiting, dysuria or hematuria.    VS all wnl  Labs WBC 15, K- 3.1, Mg 1.6, glucose 210, lipase 70  CT abd   Mild wall thickening of the wall of sigmoid colon is noted possibly   related to underlying colitis. (3/300) No bowel obstruction, collections   or ascites  Unremarkable appendix.  received 1 L LR, IV mag, Potassium    Discussion of discharge plan of care, including discharge diagnoses, medication reconciliation, and follow-ups was conducted with Dr. Moreno on 9/19/2024, and discharge was approved.    -------    58 year old male with a PMHx of HTN presents to the ED complaining of abdominal pain and watery non-bloody diarrhea for 1 weeks duration.     #Non-bloody diarrhea  #Sigmoid Colitis  #GI losses with electrolyte abnormalities  - 9/17 CT AP - Mild wall thickening of the wall of sigmoid colon is noted possibly related to underlying colitis. (3/300) No bowel obstruction, collections or ascites. Unremarkable appendix.  -  GI PCR - positive, shigella/enteroinvasive e. coli  - C Diff - negative  - c/w cipro, flagyl  - monitor and replete electrolytes (K, Mg)  - discharge home with cipro 500mg BID for 5 d    #Hyperglycemia  - A1C pending  - SSI  - monitor FS    #HTN  - resume home meds - amlodipine 5mg daily, statin, ASA      #DVT prophylaxis: Lovenox  #GI prophylaxis: PPI  #Diet: DASH/TLC, CC  #Activity: AAT  #Code status: Full Code  #Disposition: TBD 58-year-old male PMH HTN presents to the ED complaining of abdominal pain and diarrhea x 1 week. Patient also reports fevers, Tmax 102 yesterday. Endorses poor appetite and 15 pound weight loss over the past week. Patient visited a friend who had diarrhea along with his whole family who reportedly contracted the illness from their mother in a nursing home when they visited her, and symptoms started shortly after. Reports multiple daily episodes of watery diarrhea. Denies black/bloody stools. Denies headache, dizziness, syncope/near syncope, chest pain, shortness of breath, vomiting, dysuria or hematuria.    VS all wnl  Labs WBC 15, K- 3.1, Mg 1.6, glucose 210, lipase 70  CT abd   Mild wall thickening of the wall of sigmoid colon is noted possibly   related to underlying colitis. (3/300) No bowel obstruction, collections   or ascites  Unremarkable appendix.  received 1 L LR, IV mag, Potassium    Discussion of discharge plan of care, including discharge diagnoses, medication reconciliation, and follow-ups was conducted with Dr. Moreno on 9/19/2024, and discharge was approved.    -------    58 year old male with a PMHx of HTN presents to the ED complaining of abdominal pain and watery non-bloody diarrhea for 1 weeks duration.     #Non-bloody diarrhea  #Sigmoid Colitis  #GI losses with electrolyte abnormalities  - 9/17 CT AP - Mild wall thickening of the wall of sigmoid colon is noted possibly related to underlying colitis. (3/300) No bowel obstruction, collections or ascites. Unremarkable appendix.  -  GI PCR - positive, shigella/enteroinvasive e. coli  - C Diff - negative  - c/w cipro, flagyl  - monitor and replete electrolytes (K, Mg)  - discharge home with cipro 500mg BID for 5 d    #Hyperglycemia  - A1C pending  - SSI  - monitor FS    #HTN  - discharge home with amlodipine 5mg daily      #DVT prophylaxis: Lovenox  #GI prophylaxis: PPI  #Diet: DASH/TLC, CC  #Activity: AAT  #Code status: Full Code  #Disposition: Home 58-year-old male PMH HTN presents to the ED complaining of abdominal pain and diarrhea x 1 week. Patient also reports fevers, Tmax 102 yesterday. Endorses poor appetite and 15 pound weight loss over the past week. Patient visited a friend who had diarrhea along with his whole family who reportedly contracted the illness from their mother in a nursing home when they visited her, and symptoms started shortly after. Reports multiple daily episodes of watery diarrhea. Denies black/bloody stools. Denies headache, dizziness, syncope/near syncope, chest pain, shortness of breath, vomiting, dysuria or hematuria.    VS all wnl  Labs WBC 15, K- 3.1, Mg 1.6, glucose 210, lipase 70  CT abd   Mild wall thickening of the wall of sigmoid colon is noted possibly   related to underlying colitis. (3/300) No bowel obstruction, collections   or ascites  Unremarkable appendix.  received 1 L LR, IV mag, Potassium    Discussion of discharge plan of care, including discharge diagnoses, medication reconciliation, and follow-ups was conducted with Dr. Moreno on 9/22/2024, and discharge was approved.  -------    58 year old male with a PMHx of HTN presents to the ED complaining of abdominal pain and watery non-bloody diarrhea for 1 weeks duration.     #Shigella Enterocolitis   #GI losses with electrolyte abnormalities  - 9/17 CT AP - Mild wall thickening of the wall of sigmoid colon is noted possibly related to underlying colitis. (3/300) No bowel obstruction, collections or ascites. Unremarkable appendix.  - GI PCR 9/17/2024 - positive for Shigella/Enteroinvasive E.coli; repeated 9/21/2024, positive again for Shigella/E.coli; Stool culture 9/17 positive for Shigella species   - C Diff - negative  - Blood cx no growth   - Initially treated with c/w cipro, flagyl; however due to persistent fever, switched to azithromycin per ID recs; continued to have fever, so switched to IV rocephin 2g daily and was finally fever free for 24 hrs and pt did not want to stay any longer, diarrhea improving; d/w ID who recommended to treat with vantin PO for 5 days.     #Hyperglycemia/Prediabetes  - HgbA1c 6.2  - Outpt follow-up     #HTN  - discharge home with amlodipine 5mg daily

## 2024-09-19 NOTE — DISCHARGE NOTE PROVIDER - NSDCCPCAREPLAN_GEN_ALL_CORE_FT
PRINCIPAL DISCHARGE DIAGNOSIS  Diagnosis: Colitis  Assessment and Plan of Treatment: You were admitted for diarrhea due to colitis. CT imaging showed mild wall thickening of the wall of sigmoid colon which is possibly related to underlying colitis. Stool testing was positive for shigella and enteroinvasive e. coli.  Stool testing was negative for c. difficile. You were treated with cipro and flagyl in the hospital.  You will complete your antibiotics course at home with cipro 500mg twice a day for 5 days. We also prescribed amlodipine 5mg daily for high blood pressure. Please follow up with Dr. Vega on 10/29/2024 at 2:00pm at the [facility].  Colitis is swelling and irritation of your colon. Colitis may be caused by ulcers or a problem with your immune system. Bacteria, a virus, or a parasite may also cause colitis. The cause may not be known. You may have diarrhea, abdominal pain, fever, or blood or mucus in your bowel movement.  Return to the emergency department if:  You have sudden trouble breathing.  Your bowel movements are black or have blood in them.  You have blood in your vomit.  You have severe abdominal pain or your abdomen is swollen and feels hard.  You have any of the following signs of dehydration:  Dizziness or weakness  Dry mouth, cracked lips, or severe thirst  Fast heartbeat or breathing  Urinating very little or not at all  Contact your healthcare provider if:  Your symptoms get worse or do not go away.  You have a fever, chills, cough, or feel weak and achy.  You suddenly lose weight without trying.  You have questions or concerns about your condition or care.  Manage your symptoms:  Drink liquids as directed to help prevent dehydration. Good liquids to drink include water, juice, and broth.  Talk to your healthcare provider before you take NSAIDs. NSAIDs can cause worsen your symptoms if ulcers are causing your colitis.        SECONDARY DISCHARGE DIAGNOSES  Diagnosis: Watery diarrhea  Assessment and Plan of Treatment:     Diagnosis: Hypomagnesemia  Assessment and Plan of Treatment:     Diagnosis: Hypokalemia  Assessment and Plan of Treatment:      PRINCIPAL DISCHARGE DIAGNOSIS  Diagnosis: Colitis  Assessment and Plan of Treatment: You were admitted for diarrhea due to Shigella colitis. CT imaging showed mild wall thickening of the wall of sigmoid colon which is possibly related to underlying colitis. Stool PCR testing was positive for shigella and enteroinvasive e. coli.  Stool testing was negative for c. difficile. Stool culture was positive for Shigella. You were treated with cipro, flagyl, azithromycin and ceftriaxone in the hospital.  You will complete your antibiotics course at home with vantin 200mg twice a day for 5 days. We also prescribed amlodipine 5mg daily for high blood pressure. Please follow up with Dr. Vega on 10/29/2024 at 2:00pm at [facility].  Colitis is swelling and irritation of your colon. Colitis may be caused by ulcers or a problem with your immune system. Bacteria, a virus, or a parasite may also cause colitis. The cause may not be known. You may have diarrhea, abdominal pain, fever, or blood or mucus in your bowel movement.  Return to the emergency department if:  You have sudden trouble breathing.  Your bowel movements are black or have blood in them.  You have blood in your vomit.  You have severe abdominal pain or your abdomen is swollen and feels hard.  You have any of the following signs of dehydration:  Dizziness or weakness  Dry mouth, cracked lips, or severe thirst  Fast heartbeat or breathing  Urinating very little or not at all  Contact your healthcare provider if:  Your symptoms get worse or do not go away.  You have a fever, chills, cough, or feel weak and achy.  You suddenly lose weight without trying.  You have questions or concerns about your condition or care.  Manage your symptoms:  Drink liquids as directed to help prevent dehydration. Good liquids to drink include water, juice, and broth.  Talk to your healthcare provider before you take NSAIDs. NSAIDs can cause worsen your symptoms if ulcers are causing your colitis.        SECONDARY DISCHARGE DIAGNOSES  Diagnosis: Watery diarrhea  Assessment and Plan of Treatment:     Diagnosis: Hypomagnesemia  Assessment and Plan of Treatment:     Diagnosis: Hypokalemia  Assessment and Plan of Treatment:

## 2024-09-20 LAB
ANION GAP SERPL CALC-SCNC: 11 MMOL/L — SIGNIFICANT CHANGE UP (ref 7–14)
BUN SERPL-MCNC: 9 MG/DL — LOW (ref 10–20)
CALCIUM SERPL-MCNC: 8.6 MG/DL — SIGNIFICANT CHANGE UP (ref 8.4–10.5)
CHLORIDE SERPL-SCNC: 102 MMOL/L — SIGNIFICANT CHANGE UP (ref 98–110)
CO2 SERPL-SCNC: 28 MMOL/L — SIGNIFICANT CHANGE UP (ref 17–32)
CREAT SERPL-MCNC: 0.7 MG/DL — SIGNIFICANT CHANGE UP (ref 0.7–1.5)
EGFR: 105 ML/MIN/1.73M2 — SIGNIFICANT CHANGE UP
GLUCOSE SERPL-MCNC: 115 MG/DL — HIGH (ref 70–99)
HCT VFR BLD CALC: 37 % — LOW (ref 42–52)
HGB BLD-MCNC: 12.7 G/DL — LOW (ref 14–18)
MAGNESIUM SERPL-MCNC: 1.4 MG/DL — LOW (ref 1.8–2.4)
MCHC RBC-ENTMCNC: 28.7 PG — SIGNIFICANT CHANGE UP (ref 27–31)
MCHC RBC-ENTMCNC: 34.3 G/DL — SIGNIFICANT CHANGE UP (ref 32–37)
MCV RBC AUTO: 83.7 FL — SIGNIFICANT CHANGE UP (ref 80–94)
NRBC # BLD: 0 /100 WBCS — SIGNIFICANT CHANGE UP (ref 0–0)
PLATELET # BLD AUTO: 188 K/UL — SIGNIFICANT CHANGE UP (ref 130–400)
PMV BLD: 10.1 FL — SIGNIFICANT CHANGE UP (ref 7.4–10.4)
POTASSIUM SERPL-MCNC: 3.3 MMOL/L — LOW (ref 3.5–5)
POTASSIUM SERPL-SCNC: 3.3 MMOL/L — LOW (ref 3.5–5)
RBC # BLD: 4.42 M/UL — LOW (ref 4.7–6.1)
RBC # FLD: 13.2 % — SIGNIFICANT CHANGE UP (ref 11.5–14.5)
SODIUM SERPL-SCNC: 141 MMOL/L — SIGNIFICANT CHANGE UP (ref 135–146)
WBC # BLD: 4.48 K/UL — LOW (ref 4.8–10.8)
WBC # FLD AUTO: 4.48 K/UL — LOW (ref 4.8–10.8)

## 2024-09-20 PROCEDURE — 99232 SBSQ HOSP IP/OBS MODERATE 35: CPT

## 2024-09-20 RX ORDER — AMLODIPINE BESYLATE 5 MG
5 TABLET ORAL DAILY
Refills: 0 | Status: DISCONTINUED | OUTPATIENT
Start: 2024-09-20 | End: 2024-09-22

## 2024-09-20 RX ORDER — CEFTRIAXONE SODIUM 1 G
2000 VIAL (EA) INJECTION EVERY 12 HOURS
Refills: 0 | Status: DISCONTINUED | OUTPATIENT
Start: 2024-09-20 | End: 2024-09-22

## 2024-09-20 RX ORDER — MAGNESIUM SULFATE 500 MG/ML
2 VIAL (ML) INJECTION EVERY 4 HOURS
Refills: 0 | Status: COMPLETED | OUTPATIENT
Start: 2024-09-20 | End: 2024-09-20

## 2024-09-20 RX ADMIN — Medication 25 GRAM(S): at 15:59

## 2024-09-20 RX ADMIN — Medication 650 MILLIGRAM(S): at 21:32

## 2024-09-20 RX ADMIN — Medication 650 MILLIGRAM(S): at 05:04

## 2024-09-20 RX ADMIN — ENOXAPARIN SODIUM 40 MILLIGRAM(S): 150 INJECTION SUBCUTANEOUS at 21:19

## 2024-09-20 RX ADMIN — AZITHROMYCIN 500 MILLIGRAM(S): 250 TABLET, FILM COATED ORAL at 11:48

## 2024-09-20 RX ADMIN — Medication 25 GRAM(S): at 17:51

## 2024-09-20 RX ADMIN — Medication 40 MILLIEQUIVALENT(S): at 15:57

## 2024-09-20 RX ADMIN — Medication 100 MILLIGRAM(S): at 23:59

## 2024-09-20 RX ADMIN — Medication 650 MILLIGRAM(S): at 05:34

## 2024-09-20 NOTE — CHART NOTE - NSCHARTNOTEFT_GEN_A_CORE
Pt was febrile to 102.7 and 101.5 on repeat. Nurse informed to collect GI PCR, will consider broadening abx w/ Ceftriaxone after PCR is sent. Rest of vitals remains normal, unremarkable physical exam. Pt on azithro and fluids currently. Pt was febrile to 102.7 and 101.5 on repeat. Nurse informed to collect GI PCR, broadened abx w/ Ceftriaxone, f/u w/ ID in AM. Rest of vitals remains normal, unremarkable physical exam.

## 2024-09-20 NOTE — CONSULT NOTE ADULT - ASSESSMENT
ASSESSMENT  58-year-old male PMH HTN presents to the ED complaining of abdominal pain and diarrhea x 1 week. Patient also reports fevers, Tmax 102 yesterday. Endorses poor appetite and 15 pound weight loss over the past week. Patient visited a friend who had diarrhea along with his whole family who reportedly contracted the illness from their mother in a nursing home when they visited her, and symptoms started shortly after. Reports multiple daily episodes of watery diarrhea. Denies black/bloody stools. Denies headache, dizziness, syncope/near syncope, chest pain, shortness of breath, vomiting, dysuria or hematuria.    IMPRESSION  #Infectious Colitis   - CT Abdomen and Pelvis w/ IV Cont (09.17.24 @ 12:02): IMPRESSION: Mild wall thickening of the wall of sigmoid colon is noted possibly  related to underlying colitis. (3/300) No bowel obstruction, collections  or ascites Unremarkable appendix.  - GI PCR  9/17 Shigella +, Adenovirus indeterminate   - Blood Cx 9/17 NG     #Obesity BMI (kg/m2): 24.2  #Abx allergy: No Known Allergies    RECOMMENDATIONS  This is a preliminary incomplete pended note, all final recommendations to follow after interview and examination of the patient.    Please call or message on [employer] Teams if with any questions.  Spectra [id number]     ASSESSMENT  58-year-old male PMH HTN presents to the ED complaining of abdominal pain and diarrhea x 1 week. Patient also reports fevers, Tmax 102 yesterday. Endorses poor appetite and 15 pound weight loss over the past week. Patient visited a friend who had diarrhea along with his whole family who reportedly contracted the illness from their mother in a nursing home when they visited her, and symptoms started shortly after. Reports multiple daily episodes of watery diarrhea. Denies black/bloody stools. Denies headache, dizziness, syncope/near syncope, chest pain, shortness of breath, vomiting, dysuria or hematuria.    IMPRESSION  #Infectious Colitis   - CT Abdomen and Pelvis w/ IV Cont (09.17.24 @ 12:02): IMPRESSION: Mild wall thickening of the wall of sigmoid colon is noted possibly  related to underlying colitis. (3/300) No bowel obstruction, collections  or ascites Unremarkable appendix.  - GI PCR  9/17 Shigella +, Norovius indeterminate   - Blood Cx 9/17 NG     #Obesity BMI (kg/m2): 24.2  #Abx allergy: No Known Allergies    RECOMMENDATIONS  - uncommon for fevers from Shigella to persistent for more than a week; similar to norovius in which fevers usually 2-3 days   - continue trial of azithromycin for 3 days (last dose tomorrow)   - repeat GI PCR   - if GI PCR is positive for shigella again and still febrile, can change azithromycin to ceftriaxone 2g daily on Sunday   - follow-up blood cx  - having bilateral lower extremity joint pains -- could be reactive arthritis -- but this usually causes low grade fevers   - monitor stool count     Please call or message on [employer] Teams if with any questions.  Spectra [phone]

## 2024-09-20 NOTE — PROGRESS NOTE ADULT - SUBJECTIVE AND OBJECTIVE BOX
SUBJECTIVE/OVERNIGHT EVENTS  Today is hospital day 3d. This morning patient was seen and examined at bedside, resting comfortably in bed. Dignishield rectal tube placed yesterday. Pt was febrile overnight. Pt was diaphoretic this AM. Pt denies any new complaints.    MEDICATIONS  STANDING MEDICATIONS  azithromycin   Tablet 500 milliGRAM(s) Oral daily  dextrose 5%. 1000 milliLiter(s) IV Continuous <Continuous>  dextrose 5%. 1000 milliLiter(s) IV Continuous <Continuous>  dextrose 50% Injectable 25 Gram(s) IV Push once  dextrose 50% Injectable 12.5 Gram(s) IV Push once  dextrose 50% Injectable 25 Gram(s) IV Push once  enoxaparin Injectable 40 milliGRAM(s) SubCutaneous every 24 hours  glucagon  Injectable 1 milliGRAM(s) IntraMuscular once  insulin lispro (ADMELOG) corrective regimen sliding scale   SubCutaneous three times a day before meals  lactated ringers. 1000 milliLiter(s) IV Continuous <Continuous>  magnesium sulfate  IVPB 2 Gram(s) IV Intermittent every 4 hours  potassium chloride   Powder 40 milliEquivalent(s) Oral once    PRN MEDICATIONS  acetaminophen     Tablet .. 650 milliGRAM(s) Oral every 6 hours PRN  dextrose Oral Gel 15 Gram(s) Oral once PRN  ondansetron Injectable 4 milliGRAM(s) IV Push every 6 hours PRN    VITALS  T(F): 98.3 (09-20-24 @ 14:02), Max: 100.9 (09-20-24 @ 04:47)  HR: 88 (09-20-24 @ 14:02) (86 - 95)  BP: 137/79 (09-20-24 @ 14:02) (115/71 - 150/80)  RR: 18 (09-20-24 @ 14:02) (18 - 18)  SpO2: 96% (09-20-24 @ 04:47) (96% - 98%)  POCT Blood Glucose.: 155 mg/dL (09-20-24 @ 11:21)  POCT Blood Glucose.: 103 mg/dL (09-20-24 @ 08:05)  POCT Blood Glucose.: 159 mg/dL (09-19-24 @ 21:30)  POCT Blood Glucose.: 134 mg/dL (09-19-24 @ 16:44)    PHYSICAL EXAM  GENERAL: NAD, lying in bed comfortably  HEAD:  Atraumatic, normocephalic  EYES: EOMI, PERRLA, conjunctiva and sclera clear  ENT: Moist mucous membranes  NECK: Supple, no JVD  HEART: Regular rate and rhythm, no murmurs, rubs, or gallops  LUNGS: Unlabored respirations.  Clear to auscultation bilaterally, no crackles, wheezing, or rhonchi  ABDOMEN: Soft, nontender, nondistended, +BS  EXTREMITIES: 2+ peripheral pulses bilaterally. No clubbing, cyanosis, or edema  NERVOUS SYSTEM:  A&Ox3, no focal deficits   SKIN: No rashes or lesions    LABS             12.7   4.48  )-----------( 188      ( 09-20-24 @ 12:13 )             37.0     141  |  102  |  9   -------------------------<  115   09-20-24 @ 12:13  3.3  |  28  |  0.7    Ca      8.6     09-20-24 @ 12:13  Mg     1.4     09-20-24 @ 12:13    TPro  4.9  /  Alb  2.8  /  TBili  0.4  /  DBili  x   /  AST  20  /  ALT  37  /  AlkPhos  83  /  GGT  x     09-19-24 @ 07:30        Urinalysis Basic - ( 20 Sep 2024 12:13 )    Color: x / Appearance: x / SG: x / pH: x  Gluc: 115 mg/dL / Ketone: x  / Bili: x / Urobili: x   Blood: x / Protein: x / Nitrite: x   Leuk Esterase: x / RBC: x / WBC x   Sq Epi: x / Non Sq Epi: x / Bacteria: x          Culture - Reflex Stool (collected 17 Sep 2024 17:27)  Source: .Stool None  Preliminary Report (19 Sep 2024 14:36):    Culture in progress    Culture - Blood (collected 17 Sep 2024 16:55)  Source: .Blood Blood  Preliminary Report (19 Sep 2024 23:01):    No growth at 48 Hours   SUBJECTIVE/OVERNIGHT EVENTS  Today is hospital day 3d. This morning patient was seen and examined at bedside, resting comfortably in bed. Dignishield rectal tube placed yesterday. Pt was febrile overnight. Pt was diaphoretic this AM. Pt denies any new complaints.    MEDICATIONS  STANDING MEDICATIONS  azithromycin   Tablet 500 milliGRAM(s) Oral daily  dextrose 5%. 1000 milliLiter(s) IV Continuous <Continuous>  dextrose 5%. 1000 milliLiter(s) IV Continuous <Continuous>  dextrose 50% Injectable 25 Gram(s) IV Push once  dextrose 50% Injectable 12.5 Gram(s) IV Push once  dextrose 50% Injectable 25 Gram(s) IV Push once  enoxaparin Injectable 40 milliGRAM(s) SubCutaneous every 24 hours  glucagon  Injectable 1 milliGRAM(s) IntraMuscular once  insulin lispro (ADMELOG) corrective regimen sliding scale   SubCutaneous three times a day before meals  lactated ringers. 1000 milliLiter(s) IV Continuous <Continuous>  magnesium sulfate  IVPB 2 Gram(s) IV Intermittent every 4 hours  potassium chloride   Powder 40 milliEquivalent(s) Oral once    PRN MEDICATIONS  acetaminophen     Tablet .. 650 milliGRAM(s) Oral every 6 hours PRN  dextrose Oral Gel 15 Gram(s) Oral once PRN  ondansetron Injectable 4 milliGRAM(s) IV Push every 6 hours PRN    VITALS  T(F): 98.3 (09-20-24 @ 14:02), Max: 100.9 (09-20-24 @ 04:47)  HR: 88 (09-20-24 @ 14:02) (86 - 95)  BP: 137/79 (09-20-24 @ 14:02) (115/71 - 150/80)  RR: 18 (09-20-24 @ 14:02) (18 - 18)  SpO2: 96% (09-20-24 @ 04:47) (96% - 98%)  POCT Blood Glucose.: 155 mg/dL (09-20-24 @ 11:21)  POCT Blood Glucose.: 103 mg/dL (09-20-24 @ 08:05)  POCT Blood Glucose.: 159 mg/dL (09-19-24 @ 21:30)  POCT Blood Glucose.: 134 mg/dL (09-19-24 @ 16:44)    PHYSICAL EXAM  GENERAL: NAD, lying in bed comfortably, diaphoretic  HEAD:  Atraumatic, normocephalic  HEART: Regular rate and rhythm, no murmurs, rubs, or gallops  LUNGS: Unlabored respirations.  Clear to auscultation bilaterally, no crackles, wheezing, or rhonchi  ABDOMEN: Soft, nontender, nondistended, +BS  EXTREMITIES: No LE edema  NERVOUS SYSTEM:  A&Ox4, no focal deficits    LABS             12.7   4.48  )-----------( 188      ( 09-20-24 @ 12:13 )             37.0     141  |  102  |  9   -------------------------<  115   09-20-24 @ 12:13  3.3  |  28  |  0.7    Ca      8.6     09-20-24 @ 12:13  Mg     1.4     09-20-24 @ 12:13    TPro  4.9  /  Alb  2.8  /  TBili  0.4  /  DBili  x   /  AST  20  /  ALT  37  /  AlkPhos  83  /  GGT  x     09-19-24 @ 07:30        Urinalysis Basic - ( 20 Sep 2024 12:13 )    Color: x / Appearance: x / SG: x / pH: x  Gluc: 115 mg/dL / Ketone: x  / Bili: x / Urobili: x   Blood: x / Protein: x / Nitrite: x   Leuk Esterase: x / RBC: x / WBC x   Sq Epi: x / Non Sq Epi: x / Bacteria: x          Culture - Reflex Stool (collected 17 Sep 2024 17:27)  Source: .Stool None  Preliminary Report (19 Sep 2024 14:36):    Culture in progress    Culture - Blood (collected 17 Sep 2024 16:55)  Source: .Blood Blood  Preliminary Report (19 Sep 2024 23:01):    No growth at 48 Hours

## 2024-09-20 NOTE — PROGRESS NOTE ADULT - ASSESSMENT
58 year old male with a PMHx of HTN presents to the ED complaining of abdominal pain and watery non-bloody diarrhea for 1 week duration.     E.Coli/shigella enteritis  Infectious colitis  Hypokalemia  Hypomagnesemia  Sepsis ruled in      GI losses with electrolyte abnormalities   CT shows Mild wall thickening of the wall of sigmoid colon is noted possibly   related to underlying colitis  IV hydration  Potassium & Mg supplemented  Keep monitoring K and Mg  C diff negative  ID consult due to persistent fever, d/w Dr. Mejía  Cont azithromycin per ID  Resume home meds including amlodipine , statin and ASA  Sliding scale coverage while poor po intake.   Follow A1c  DVT proph- Lovenox  Disposition: remains medically active for now d/t fever

## 2024-09-20 NOTE — PROGRESS NOTE ADULT - SUBJECTIVE AND OBJECTIVE BOX
Patient seen and examined. Events over the last 24 hours noted.   Dignishield placed last night  Pt is febrile again this AM, upset discharge is canceled     T(F): 98.4 (09-20-24 @ 09:00), Max: 100.9 (09-20-24 @ 04:47)  HR: 95 (09-20-24 @ 04:47)  BP: 150/80 (09-20-24 @ 04:47)  RR: 18 (09-20-24 @ 04:47)  SpO2: 96% (09-20-24 @ 04:47) (96% - 98%)    PHYSICAL EXAM:  GEN: No acute distress  HEENT: normocephalic, atraumatic, anicteric  LUNGS: b/l breath sounds present, clear to auscultation bilaterally   HEART: S1/S2 present. RRR  ABD: Soft, non-tender, non-distended. Bowel sounds present  EXT: warm   NEURO: awake, no new focal deficits    LABS  09-19    134[L]  |  95[L]  |  5[L]  ----------------------------<  146[H]  3.0[L]   |  28  |  0.7    Ca    7.6[L]      19 Sep 2024 07:30  Mg     1.8     09-19    TPro  4.9[L]  /  Alb  2.8[L]  /  TBili  0.4  /  DBili  x   /  AST  20  /  ALT  37  /  AlkPhos  83  09-19                          11.9   5.55  )-----------( 177      ( 19 Sep 2024 07:30 )             34.4            MEDICATIONS  (STANDING):  azithromycin   Tablet 500 milliGRAM(s) Oral daily  dextrose 5%. 1000 milliLiter(s) (50 mL/Hr) IV Continuous <Continuous>  dextrose 5%. 1000 milliLiter(s) (100 mL/Hr) IV Continuous <Continuous>  dextrose 50% Injectable 25 Gram(s) IV Push once  dextrose 50% Injectable 12.5 Gram(s) IV Push once  dextrose 50% Injectable 25 Gram(s) IV Push once  enoxaparin Injectable 40 milliGRAM(s) SubCutaneous every 24 hours  glucagon  Injectable 1 milliGRAM(s) IntraMuscular once  insulin lispro (ADMELOG) corrective regimen sliding scale   SubCutaneous three times a day before meals  lactated ringers. 1000 milliLiter(s) (100 mL/Hr) IV Continuous <Continuous>    MEDICATIONS  (PRN):  acetaminophen     Tablet .. 650 milliGRAM(s) Oral every 6 hours PRN Temp greater or equal to 38.5C (101.3F)  dextrose Oral Gel 15 Gram(s) Oral once PRN Blood Glucose LESS THAN 70 milliGRAM(s)/deciliter  ondansetron Injectable 4 milliGRAM(s) IV Push every 6 hours PRN Nausea

## 2024-09-20 NOTE — CONSULT NOTE ADULT - SUBJECTIVE AND OBJECTIVE BOX
YENNY VILLEDA  58y, Male  Allergy: No Known Allergies      CHIEF COMPLAINT: colitis (19 Sep 2024 12:32)      LOS  3d    HPI:  58-year-old male PMH HTN presents to the ED complaining of abdominal pain and diarrhea x 1 week. Patient also reports fevers, Tmax 102 yesterday. Endorses poor appetite and 15 pound weight loss over the past week. Patient visited a friend who had diarrhea along with his whole family who reportedly contracted the illness from their mother in a nursing home when they visited her, and symptoms started shortly after. Reports multiple daily episodes of watery diarrhea. Denies black/bloody stools. Denies headache, dizziness, syncope/near syncope, chest pain, shortness of breath, vomiting, dysuria or hematuria.    VS all wnl  Labs WBC 15, K- 3.1, Mg 1.6, glucose 210, lipase 70  CT abd   Mild wall thickening of the wall of sigmoid colon is noted possibly   related to underlying colitis. (3/300) No bowel obstruction, collections   or ascites  Unremarkable appendix.  received 1 L LR, IV mag, Potassium     (17 Sep 2024 15:06)      INFECTIOUS DISEASE HISTORY:  History as above.    PAST MEDICAL & SURGICAL HISTORY:  Alcohol abuse      Drug use      No significant past surgical history          FAMILY HISTORY  No pertinent family history in first degree relatives        SOCIAL HISTORY  Social History:        ROS  General: Denies rigors, nightsweats  HEENT: Denies headache, rhinorrhea, sore throat, eye pain  CV: Denies CP, palpitations  PULM: Denies wheezing, hemoptysis  GI: Denies hematemesis, hematochezia, melena  : Denies discharge, hematuria  MSK: Denies arthralgias, myalgias  SKIN: Denies rash, lesions  NEURO: Denies paresthesias, weakness  PSYCH: Denies depression, anxiety    VITALS:  T(F): 98.4, Max: 100.9 (24 @ 04:47)  HR: 95  BP: 150/80  RR: 18Vital Signs Last 24 Hrs  T(C): 36.9 (20 Sep 2024 09:00), Max: 38.3 (20 Sep 2024 04:47)  T(F): 98.4 (20 Sep 2024 09:00), Max: 100.9 (20 Sep 2024 04:47)  HR: 95 (20 Sep 2024 04:47) (86 - 95)  BP: 150/80 (20 Sep 2024 04:47) (115/71 - 150/80)  BP(mean): --  RR: 18 (20 Sep 2024 04:47) (18 - 18)  SpO2: 96% (20 Sep 2024 04:47) (96% - 98%)        PHYSICAL EXAM:  Gen: NAD, resting in bed  HEENT: Normocephalic, atraumatic  Neck: supple, no lymphadenopathy  CV: Regular rate & regular rhythm  Lungs: decreased BS at bases, no fremitus  Abdomen: Soft, BS present  Ext: Warm, well perfused  Neuro: non focal, awake  Skin: no rash, no erythema  Lines: no phlebitis    TESTS & MEASUREMENTS:                        11.9   5.55  )-----------( 177      ( 19 Sep 2024 07:30 )             34.4         134[L]  |  95[L]  |  5[L]  ----------------------------<  146[H]  3.0[L]   |  28  |  0.7    Ca    7.6[L]      19 Sep 2024 07:30  Mg     1.8         TPro  4.9[L]  /  Alb  2.8[L]  /  TBili  0.4  /  DBili  x   /  AST  20  /  ALT  37  /  AlkPhos  83        LIVER FUNCTIONS - ( 19 Sep 2024 07:30 )  Alb: 2.8 g/dL / Pro: 4.9 g/dL / ALK PHOS: 83 U/L / ALT: 37 U/L / AST: 20 U/L / GGT: x           Urinalysis Basic - ( 19 Sep 2024 07:51 )    Color: Yellow / Appearance: Clear / S.010 / pH: x  Gluc: x / Ketone: Negative mg/dL  / Bili: Negative / Urobili: 0.2 mg/dL   Blood: x / Protein: Negative mg/dL / Nitrite: Negative   Leuk Esterase: Negative / RBC: x / WBC x   Sq Epi: x / Non Sq Epi: x / Bacteria: x        Culture - Reflex Stool (collected 24 @ 17:27)  Source: .Stool None  Preliminary Report (24 @ 14:36):    Culture in progress    Culture - Blood (collected 24 @ 16:55)  Source: .Blood Blood  Preliminary Report (24 @ 23:01):    No growth at 48 Hours        Lactate, Blood: 1.7 mmol/L (24 @ 11:23)  Lactate, Blood: 3.0 mmol/L (24 @ 20:48)  Lactate, Blood: 3.2 mmol/L (24 @ 16:55)      INFECTIOUS DISEASES TESTING      RADIOLOGY & ADDITIONAL TESTS:  I have personally reviewed the last Chest xray  CXR      CT  CT Abdomen and Pelvis w/ IV Cont:   ACC: [id number] EXAM:  CT ABDOMEN AND PELVIS IC   ORDERED BY: CORNELIA HWANG     PROCEDURE DATE:  2024          INTERPRETATION:  Reason for study: Abdominal pain  TECHNIQUE: Contiguous axial CT images were obtained from the diaphragms   to the pubic symphysis after administration intravenous contrast.    CONTRAST VOLUME: Omnipaque 350. 95 cc administered. 5 cc discarded.    Reformatted images in the coronal and sagittal planes were acquired.        COMPARISON: CT abdomen and pelvis 2017    FINDINGS:  LOWER THORAX: unremarkable  HEPATOBILIARY: No focal hepatic lesions or ductal dilatation.   Unremarkable gallbladder    SPLEEN: No focal splenic lesions. Left upper quadrant splenic varices are   present, similar to earlier study..    PANCREAS: Unremarkable  ADRENAL GLANDS: Unremarkable    KIDNEYS: Symmetric enhancement. No hydronephrosis or suspicious renal   lesions.    ABDOMINOPELVIC NODES: No lymphadenopathy.    PELVIC ORGANS: Unremarkable urinary bladder and prostate gland.    PERITONEUM /MESENTERY/BOWEL: Mild wall thickening of the wall of sigmoid   colon is noted possibly related to underlying colitis. (3/300) No bowel   obstruction, collections or ascites. Nonspecific 8 mm lymph node adjacent   to the ileocecal valve is noted (3/230). Unremarkable appendix    BONES/SOFT TISSUES: Degenerative disc disease lower lumbar spine with   mild compression superior endplate of L1, new since earlier study.        IMPRESSION:    Mild wall thickening of the wall of sigmoid colon is noted possibly   related to underlying colitis. (3/300) No bowel obstruction, collections   or ascites    Unremarkable appendix.    --- End of Report ---            FINN GIRON MD; Attending Radiologist  This document has been electronically signed. Sep 17 2024 12:46PM (24 @ 12:02)      CARDIOLOGY TESTING      MEDICATIONS  azithromycin   Tablet 500 Oral daily  dextrose 5%. 1000 IV Continuous <Continuous>  dextrose 5%. 1000 IV Continuous <Continuous>  dextrose 50% Injectable 25 IV Push once  dextrose 50% Injectable 12.5 IV Push once  dextrose 50% Injectable 25 IV Push once  enoxaparin Injectable 40 SubCutaneous every 24 hours  glucagon  Injectable 1 IntraMuscular once  insulin lispro (ADMELOG) corrective regimen sliding scale  SubCutaneous three times a day before meals  lactated ringers. 1000 IV Continuous <Continuous>      Weight  Weight (kg): 72.1 (24 @ 08:16)    ANTIBIOTICS:  azithromycin   Tablet 500 milliGRAM(s) Oral daily      ALLERGIES:  No Known Allergies       YENNY VILLEDA  58y, Male  Allergy: No Known Allergies      CHIEF COMPLAINT: colitis (19 Sep 2024 12:32)      LOS  3d    HPI:  58-year-old male PMH HTN presents to the ED complaining of abdominal pain and diarrhea x 1 week. Patient also reports fevers, Tmax 102 yesterday. Endorses poor appetite and 15 pound weight loss over the past week. Patient visited a friend who had diarrhea along with his whole family who reportedly contracted the illness from their mother in a nursing home when they visited her, and symptoms started shortly after. Reports multiple daily episodes of watery diarrhea. Denies black/bloody stools. Denies headache, dizziness, syncope/near syncope, chest pain, shortness of breath, vomiting, dysuria or hematuria.    VS all wnl  Labs WBC 15, K- 3.1, Mg 1.6, glucose 210, lipase 70  CT abd   Mild wall thickening of the wall of sigmoid colon is noted possibly   related to underlying colitis. (3/300) No bowel obstruction, collections   or ascites  Unremarkable appendix.  received 1 L LR, IV mag, Potassium     (17 Sep 2024 15:06)      INFECTIOUS DISEASE HISTORY:  History as above.  Has been on cipro/flagyl since .   ID consulted for persistent fevers.   GI PCR positive for shigella, intermediate for norovirus   Still with ongoing diarrhea.   Dignishield placed on .   ROS, Reports bilateral knee pains and irritated eyes without conjunctivities.   Denies any other joint pains. Arianna abdominal pain. No rash.     PAST MEDICAL & SURGICAL HISTORY:  Alcohol abuse      Drug use      No significant past surgical history          FAMILY HISTORY  No pertinent family history in first degree relatives        SOCIAL HISTORY  Social History:        ROS  General: Denies rigors, nightsweats  HEENT: Denies headache, rhinorrhea, sore throat, eye pain  CV: Denies CP, palpitations  PULM: Denies wheezing, hemoptysis  GI: Denies hematemesis, hematochezia, melena  : Denies discharge, hematuria  MSK: Denies arthralgias, myalgias  SKIN: Denies rash, lesions  NEURO: Denies paresthesias, weakness  PSYCH: Denies depression, anxiety    VITALS:  T(F): 98.4, Max: 100.9 (24 @ 04:47)  HR: 95  BP: 150/80  RR: 18Vital Signs Last 24 Hrs  T(C): 36.9 (20 Sep 2024 09:00), Max: 38.3 (20 Sep 2024 04:47)  T(F): 98.4 (20 Sep 2024 09:00), Max: 100.9 (20 Sep 2024 04:47)  HR: 95 (20 Sep 2024 04:47) (86 - 95)  BP: 150/80 (20 Sep 2024 04:47) (115/71 - 150/80)  BP(mean): --  RR: 18 (20 Sep 2024 04:47) (18 - 18)  SpO2: 96% (20 Sep 2024 04:47) (96% - 98%)        PHYSICAL EXAM:  Gen: NAD, resting in bed  HEENT: Normocephalic, atraumatic  Neck: supple, no lymphadenopathy  CV: Regular rate & regular rhythm  Lungs: decreased BS at bases, no fremitus  Abdomen: Soft, BS present  Ext: Warm, well perfused  Neuro: non focal, awake  Skin: no rash, no erythema  Lines: no phlebitis    TESTS & MEASUREMENTS:                        11.9   5.55  )-----------( 177      ( 19 Sep 2024 07:30 )             34.4         134[L]  |  95[L]  |  5[L]  ----------------------------<  146[H]  3.0[L]   |  28  |  0.7    Ca    7.6[L]      19 Sep 2024 07:30  Mg     1.8         TPro  4.9[L]  /  Alb  2.8[L]  /  TBili  0.4  /  DBili  x   /  AST  20  /  ALT  37  /  AlkPhos  83        LIVER FUNCTIONS - ( 19 Sep 2024 07:30 )  Alb: 2.8 g/dL / Pro: 4.9 g/dL / ALK PHOS: 83 U/L / ALT: 37 U/L / AST: 20 U/L / GGT: x           Urinalysis Basic - ( 19 Sep 2024 07:51 )    Color: Yellow / Appearance: Clear / S.010 / pH: x  Gluc: x / Ketone: Negative mg/dL  / Bili: Negative / Urobili: 0.2 mg/dL   Blood: x / Protein: Negative mg/dL / Nitrite: Negative   Leuk Esterase: Negative / RBC: x / WBC x   Sq Epi: x / Non Sq Epi: x / Bacteria: x        Culture - Reflex Stool (collected 24 @ 17:27)  Source: .Stool None  Preliminary Report (24 @ 14:36):    Culture in progress    Culture - Blood (collected 24 @ 16:55)  Source: .Blood Blood  Preliminary Report (24 @ 23:01):    No growth at 48 Hours        Lactate, Blood: 1.7 mmol/L (24 @ 11:23)  Lactate, Blood: 3.0 mmol/L (24 @ 20:48)  Lactate, Blood: 3.2 mmol/L (24 @ 16:55)      INFECTIOUS DISEASES TESTING      RADIOLOGY & ADDITIONAL TESTS:  I have personally reviewed the last Chest xray  CXR      CT  CT Abdomen and Pelvis w/ IV Cont:   ACC: [id number] EXAM:  CT ABDOMEN AND PELVIS IC   ORDERED BY: CORNELIA HWANG     PROCEDURE DATE:  2024          INTERPRETATION:  Reason for study: Abdominal pain  TECHNIQUE: Contiguous axial CT images were obtained from the diaphragms   to the pubic symphysis after administration intravenous contrast.    CONTRAST VOLUME: Omnipaque 350. 95 cc administered. 5 cc discarded.    Reformatted images in the coronal and sagittal planes were acquired.        COMPARISON: CT abdomen and pelvis 2017    FINDINGS:  LOWER THORAX: unremarkable  HEPATOBILIARY: No focal hepatic lesions or ductal dilatation.   Unremarkable gallbladder    SPLEEN: No focal splenic lesions. Left upper quadrant splenic varices are   present, similar to earlier study..    PANCREAS: Unremarkable  ADRENAL GLANDS: Unremarkable    KIDNEYS: Symmetric enhancement. No hydronephrosis or suspicious renal   lesions.    ABDOMINOPELVIC NODES: No lymphadenopathy.    PELVIC ORGANS: Unremarkable urinary bladder and prostate gland.    PERITONEUM /MESENTERY/BOWEL: Mild wall thickening of the wall of sigmoid   colon is noted possibly related to underlying colitis. (3/300) No bowel   obstruction, collections or ascites. Nonspecific 8 mm lymph node adjacent   to the ileocecal valve is noted (3/230). Unremarkable appendix    BONES/SOFT TISSUES: Degenerative disc disease lower lumbar spine with   mild compression superior endplate of L1, new since earlier study.        IMPRESSION:    Mild wall thickening of the wall of sigmoid colon is noted possibly   related to underlying colitis. (3/300) No bowel obstruction, collections   or ascites    Unremarkable appendix.    --- End of Report ---            FINN GIRON MD; Attending Radiologist  This document has been electronically signed. Sep 17 2024 12:46PM (24 @ 12:02)      CARDIOLOGY TESTING      MEDICATIONS  azithromycin   Tablet 500 Oral daily  dextrose 5%. 1000 IV Continuous <Continuous>  dextrose 5%. 1000 IV Continuous <Continuous>  dextrose 50% Injectable 25 IV Push once  dextrose 50% Injectable 12.5 IV Push once  dextrose 50% Injectable 25 IV Push once  enoxaparin Injectable 40 SubCutaneous every 24 hours  glucagon  Injectable 1 IntraMuscular once  insulin lispro (ADMELOG) corrective regimen sliding scale  SubCutaneous three times a day before meals  lactated ringers. 1000 IV Continuous <Continuous>      Weight  Weight (kg): 72.1 (24 @ 08:16)    ANTIBIOTICS:  azithromycin   Tablet 500 milliGRAM(s) Oral daily      ALLERGIES:  No Known Allergies

## 2024-09-20 NOTE — PROGRESS NOTE ADULT - ASSESSMENT
58 year old male with a PMHx of HTN presents to the ED complaining of abdominal pain and watery non-bloody diarrhea for 1 weeks duration.     #Non-bloody diarrhea  #Sigmoid Colitis  #GI losses with electrolyte abnormalities  - 9/17 CT AP - Mild wall thickening of the wall of sigmoid colon is noted possibly related to underlying colitis. (3/300) No bowel obstruction, collections or ascites. Unremarkable appendix.  - monitor and replete electrolytes (K, Mg)  -  GI PCR - positive, shigella/enteroinvasive e. coli, norovirus indeterminate  - C Diff - negative  - d/c cipro, flagyl  - started azithromycin  - norovirus pcr pending  -BCx  - ID eval    #Hyperglycemia  - A1C 6.2  - SSI  - monitor FS    #HTN  - started amlodipine 5mg      #DVT prophylaxis: Lovenox  #GI prophylaxis: PPI  #Diet: DASH/TLC, CC  #Activity: AAT  #Code status: Full Code  #Disposition: TBD    #Pending: ID, BCx, norovirus pcr, monitor electrolytes   58 year old male with a PMHx of HTN presents to the ED complaining of abdominal pain and watery non-bloody diarrhea for 1 weeks duration.     #Non-bloody diarrhea  #Sigmoid Colitis  #GI losses with electrolyte abnormalities  - 9/17 CT AP - Mild wall thickening of the wall of sigmoid colon is noted possibly related to underlying colitis. (3/300) No bowel obstruction, collections or ascites. Unremarkable appendix.  - monitor and replete electrolytes (K, Mg)  -  GI PCR - positive, shigella/enteroinvasive e. coli, norovirus indeterminate  - C Diff - negative  - d/c cipro, flagyl  - started azithromycin  - norovirus pcr pending  -BCx  - ID eval    #Hyperglycemia  - A1C 6.2  - SSI  - monitor FS    #HTN  - started amlodipine 5mg      #DVT prophylaxis: Lovenox  #GI prophylaxis: PPI  #Diet: DASH/TLC, CC  #Activity: AAT  #Code status: Full Code  #Disposition: Home    #Pending: ID, BCx, norovirus pcr, monitor electrolytes

## 2024-09-21 LAB
-  AMPICILLIN: SIGNIFICANT CHANGE UP
-  TRIMETHOPRIM/SULFAMETHOXAZOLE: SIGNIFICANT CHANGE UP
ALBUMIN SERPL ELPH-MCNC: 3.1 G/DL — LOW (ref 3.5–5.2)
ALP SERPL-CCNC: 88 U/L — SIGNIFICANT CHANGE UP (ref 30–115)
ALT FLD-CCNC: 36 U/L — SIGNIFICANT CHANGE UP (ref 0–41)
ANION GAP SERPL CALC-SCNC: 11 MMOL/L — SIGNIFICANT CHANGE UP (ref 7–14)
AST SERPL-CCNC: 24 U/L — SIGNIFICANT CHANGE UP (ref 0–41)
BASOPHILS # BLD AUTO: 0.02 K/UL — SIGNIFICANT CHANGE UP (ref 0–0.2)
BASOPHILS NFR BLD AUTO: 0.5 % — SIGNIFICANT CHANGE UP (ref 0–1)
BILIRUB SERPL-MCNC: 0.3 MG/DL — SIGNIFICANT CHANGE UP (ref 0.2–1.2)
BUN SERPL-MCNC: 9 MG/DL — LOW (ref 10–20)
CALCIUM SERPL-MCNC: 8.1 MG/DL — LOW (ref 8.4–10.5)
CHLORIDE SERPL-SCNC: 101 MMOL/L — SIGNIFICANT CHANGE UP (ref 98–110)
CO2 SERPL-SCNC: 29 MMOL/L — SIGNIFICANT CHANGE UP (ref 17–32)
CREAT SERPL-MCNC: 0.6 MG/DL — LOW (ref 0.7–1.5)
EGFR: 111 ML/MIN/1.73M2 — SIGNIFICANT CHANGE UP
EOSINOPHIL # BLD AUTO: 0.04 K/UL — SIGNIFICANT CHANGE UP (ref 0–0.7)
EOSINOPHIL NFR BLD AUTO: 1 % — SIGNIFICANT CHANGE UP (ref 0–8)
GI PCR PANEL: DETECTED
GLUCOSE SERPL-MCNC: 96 MG/DL — SIGNIFICANT CHANGE UP (ref 70–99)
HCT VFR BLD CALC: 36 % — LOW (ref 42–52)
HGB BLD-MCNC: 12.3 G/DL — LOW (ref 14–18)
IMM GRANULOCYTES NFR BLD AUTO: 1.3 % — HIGH (ref 0.1–0.3)
LYMPHOCYTES # BLD AUTO: 1.11 K/UL — LOW (ref 1.2–3.4)
LYMPHOCYTES # BLD AUTO: 28.5 % — SIGNIFICANT CHANGE UP (ref 20.5–51.1)
MAGNESIUM SERPL-MCNC: 1.7 MG/DL — LOW (ref 1.8–2.4)
MCHC RBC-ENTMCNC: 28.9 PG — SIGNIFICANT CHANGE UP (ref 27–31)
MCHC RBC-ENTMCNC: 34.2 G/DL — SIGNIFICANT CHANGE UP (ref 32–37)
MCV RBC AUTO: 84.5 FL — SIGNIFICANT CHANGE UP (ref 80–94)
METHOD TYPE: SIGNIFICANT CHANGE UP
MONOCYTES # BLD AUTO: 0.45 K/UL — SIGNIFICANT CHANGE UP (ref 0.1–0.6)
MONOCYTES NFR BLD AUTO: 11.5 % — HIGH (ref 1.7–9.3)
NEUTROPHILS # BLD AUTO: 2.23 K/UL — SIGNIFICANT CHANGE UP (ref 1.4–6.5)
NEUTROPHILS NFR BLD AUTO: 57.2 % — SIGNIFICANT CHANGE UP (ref 42.2–75.2)
NRBC # BLD: 0 /100 WBCS — SIGNIFICANT CHANGE UP (ref 0–0)
PLATELET # BLD AUTO: 177 K/UL — SIGNIFICANT CHANGE UP (ref 130–400)
PMV BLD: 10.2 FL — SIGNIFICANT CHANGE UP (ref 7.4–10.4)
POTASSIUM SERPL-MCNC: 3.8 MMOL/L — SIGNIFICANT CHANGE UP (ref 3.5–5)
POTASSIUM SERPL-SCNC: 3.8 MMOL/L — SIGNIFICANT CHANGE UP (ref 3.5–5)
PROT SERPL-MCNC: 5.2 G/DL — LOW (ref 6–8)
RBC # BLD: 4.26 M/UL — LOW (ref 4.7–6.1)
RBC # FLD: 13.2 % — SIGNIFICANT CHANGE UP (ref 11.5–14.5)
SHIGELLA DNA SPEC QL NAA+PROBE: DETECTED
SODIUM SERPL-SCNC: 141 MMOL/L — SIGNIFICANT CHANGE UP (ref 135–146)
WBC # BLD: 3.9 K/UL — LOW (ref 4.8–10.8)
WBC # FLD AUTO: 3.9 K/UL — LOW (ref 4.8–10.8)

## 2024-09-21 PROCEDURE — 99233 SBSQ HOSP IP/OBS HIGH 50: CPT

## 2024-09-21 RX ORDER — AZITHROMYCIN 250 MG/1
500 TABLET, FILM COATED ORAL DAILY
Refills: 0 | Status: DISCONTINUED | OUTPATIENT
Start: 2024-09-21 | End: 2024-09-22

## 2024-09-21 RX ORDER — MAGNESIUM SULFATE 500 MG/ML
2 VIAL (ML) INJECTION ONCE
Refills: 0 | Status: COMPLETED | OUTPATIENT
Start: 2024-09-21 | End: 2024-09-21

## 2024-09-21 RX ADMIN — Medication 100 MILLIGRAM(S): at 06:15

## 2024-09-21 RX ADMIN — Medication 25 GRAM(S): at 11:42

## 2024-09-21 RX ADMIN — AZITHROMYCIN 500 MILLIGRAM(S): 250 TABLET, FILM COATED ORAL at 11:42

## 2024-09-21 RX ADMIN — ENOXAPARIN SODIUM 40 MILLIGRAM(S): 150 INJECTION SUBCUTANEOUS at 21:10

## 2024-09-21 RX ADMIN — Medication 100 MILLIGRAM(S): at 17:22

## 2024-09-21 RX ADMIN — Medication 5 MILLIGRAM(S): at 06:15

## 2024-09-21 NOTE — PROGRESS NOTE ADULT - ASSESSMENT
58 year old male with a PMHx of HTN presents to the ED complaining of abdominal pain and watery non-bloody diarrhea for 1 week duration.     Shigella enterocolitis (GI PCR positive for shigella on 9/17, 9/21, stool culture 9/17 positive for shigella)  Infectious colitis  Hypokalemia  Hypomagnesemia  Sepsis ruled in      GI losses with electrolyte abnormalities   CT shows Mild wall thickening of the wall of sigmoid colon is noted possibly   related to underlying colitis  IV hydration  Potassium & Mg supplemented  Keep monitoring K and Mg  C diff negative  ID consult due to persistent fever, d/w Dr. Mejía  Cont iv rocephin   Resume home meds including amlodipine , statin and ASA  Sliding scale coverage while poor po intake.   Follow A1c  DVT proph- Lovenox  Disposition: remains medically active for now d/t fever

## 2024-09-21 NOTE — PROGRESS NOTE ADULT - SUBJECTIVE AND OBJECTIVE BOX
Patient seen and examined. Events over the last 24 hours noted.   Pt had fever to 102.7 last night   Atbx switched to iv rocephin overnight  This AM pt denies complaints, states he has fevers every day and that it is "normal" for him.   He has not yet had a BM yet to know if he has diarrhea     T(F): 98.1 (09-21-24 @ 12:27), Max: 102.7 (09-20-24 @ 21:27)  HR: 78 (09-21-24 @ 12:27)  BP: 108/66 (09-21-24 @ 12:27)  RR: 18 (09-21-24 @ 12:27)  SpO2: 96% (09-21-24 @ 12:27) (96% - 97%)    PHYSICAL EXAM:  GEN: No acute distress  HEENT: normocephalic, atraumatic, anicteric  LUNGS: b/l breath sounds present, clear to auscultation bilaterally   HEART: S1/S2 present. RRR  ABD: Soft, non-tender, non-distended. Bowel sounds present  EXT: warm   NEURO: awake, no new focal deficits    LABS  09-21    141  |  101  |  9[L]  ----------------------------<  96  3.8   |  29  |  0.6[L]    Ca    8.1[L]      21 Sep 2024 07:21  Mg     1.7     09-21    TPro  5.2[L]  /  Alb  3.1[L]  /  TBili  0.3  /  DBili  x   /  AST  24  /  ALT  36  /  AlkPhos  88  09-21                          12.3   3.90  )-----------( 177      ( 21 Sep 2024 07:21 )             36.0            MEDICATIONS  (STANDING):  amLODIPine   Tablet 5 milliGRAM(s) Oral daily  azithromycin   Tablet 500 milliGRAM(s) Oral daily  cefTRIAXone   IVPB 2000 milliGRAM(s) IV Intermittent every 12 hours  dextrose 5%. 1000 milliLiter(s) (50 mL/Hr) IV Continuous <Continuous>  dextrose 5%. 1000 milliLiter(s) (100 mL/Hr) IV Continuous <Continuous>  dextrose 50% Injectable 25 Gram(s) IV Push once  dextrose 50% Injectable 12.5 Gram(s) IV Push once  dextrose 50% Injectable 25 Gram(s) IV Push once  enoxaparin Injectable 40 milliGRAM(s) SubCutaneous every 24 hours  glucagon  Injectable 1 milliGRAM(s) IntraMuscular once  insulin lispro (ADMELOG) corrective regimen sliding scale   SubCutaneous three times a day before meals  lactated ringers. 1000 milliLiter(s) (100 mL/Hr) IV Continuous <Continuous>    MEDICATIONS  (PRN):  acetaminophen     Tablet .. 650 milliGRAM(s) Oral every 6 hours PRN Temp greater or equal to 38.5C (101.3F)  dextrose Oral Gel 15 Gram(s) Oral once PRN Blood Glucose LESS THAN 70 milliGRAM(s)/deciliter  ondansetron Injectable 4 milliGRAM(s) IV Push every 6 hours PRN Nausea

## 2024-09-22 VITALS
HEART RATE: 84 BPM | TEMPERATURE: 99 F | SYSTOLIC BLOOD PRESSURE: 109 MMHG | DIASTOLIC BLOOD PRESSURE: 69 MMHG | RESPIRATION RATE: 18 BRPM | OXYGEN SATURATION: 99 %

## 2024-09-22 LAB
CULTURE RESULTS: SIGNIFICANT CHANGE UP
SPECIMEN SOURCE: SIGNIFICANT CHANGE UP

## 2024-09-22 PROCEDURE — 99239 HOSP IP/OBS DSCHRG MGMT >30: CPT

## 2024-09-22 RX ORDER — CEFPODOXIME PROXETIL 50 MG/5 ML
1 SUSPENSION, RECONSTITUTED, ORAL (ML) ORAL
Qty: 10 | Refills: 0
Start: 2024-09-22 | End: 2024-09-26

## 2024-09-22 RX ADMIN — Medication 5 MILLIGRAM(S): at 06:22

## 2024-09-22 RX ADMIN — Medication 100 MILLIGRAM(S): at 06:24

## 2024-09-22 RX ADMIN — AZITHROMYCIN 500 MILLIGRAM(S): 250 TABLET, FILM COATED ORAL at 11:10

## 2024-09-22 NOTE — DISCHARGE NOTE NURSING/CASE MANAGEMENT/SOCIAL WORK - NSDCPEFALRISK_GEN_ALL_CORE
For information on Fall & Injury Prevention, visit: [email or web address] OR  [email or web address] OR  https://www.cdc.gov/steadi/patient.html

## 2024-09-22 NOTE — PROGRESS NOTE ADULT - PROVIDER SPECIALTY LIST ADULT
Hospitalist
Hospitalist
Internal Medicine
Hospitalist
Hospitalist
Internal Medicine
Internal Medicine

## 2024-09-22 NOTE — PROGRESS NOTE ADULT - ASSESSMENT
58 year old male with a PMHx of HTN presents to the ED complaining of abdominal pain and watery non-bloody diarrhea for 1 weeks duration.     #Non-bloody diarrhea  #Sigmoid Colitis  #GI losses with electrolyte abnormalities  - 9/17 CT AP - Mild wall thickening of the wall of sigmoid colon is noted possibly related to underlying colitis. (3/300) No bowel obstruction, collections or ascites. Unremarkable appendix.  - monitor and replete electrolytes (K, Mg)  -  GI PCR - positive, shigella/enteroinvasive e. coli, norovirus indeterminate  - C Diff - negative  - d/c cipro, flagyl  - started azithromycin  - norovirus pcr pending  -BCx  - ID eval    #Hyperglycemia  - A1C 6.2  - SSI  - monitor FS    #HTN  - started amlodipine 5mg      #DVT prophylaxis: Lovenox  #GI prophylaxis: PPI  #Diet: DASH/TLC, CC  #Activity: AAT  #Code status: Full Code  #Disposition: Home    #Pending: ID, BCx, norovirus pcr, monitor electrolytes   58 year old male with a PMHx of HTN presents to the ED complaining of abdominal pain and watery non-bloody diarrhea for 1 weeks duration.     #Non-bloody diarrhea  #Sigmoid Colitis  #GI losses with electrolyte abnormalities  - 9/17 CT AP - Mild wall thickening of the wall of sigmoid colon is noted possibly related to underlying colitis. (3/300) No bowel obstruction, collections or ascites. Unremarkable appendix.  - monitor and replete electrolytes (K, Mg)  -  GI PCR - positive, shigella/enteroinvasive e. coli, norovirus indeterminate  - C Diff - negative  - d/c cipro, flagyl  - started azithromycin  - norovirus pcr neg  - BCx  - ID eval - recs appreciated    #Hyperglycemia  - A1C 6.2  - SSI  - monitor FS    #HTN  - started amlodipine 5mg      #DVT prophylaxis: Lovenox  #GI prophylaxis: PPI  #Diet: DASH/TLC, CC  #Activity: AAT  #Code status: Full Code  #Disposition: Home    #Pending: D/C

## 2024-09-22 NOTE — DISCHARGE NOTE NURSING/CASE MANAGEMENT/SOCIAL WORK - PATIENT PORTAL LINK FT
You can access the FollowMyHealth Patient Portal offered by [facility] by registering at the following website: [email or web address]. By joining [employer]’s FollowMyHealth portal, you will also be able to view your health information using other applications (apps) compatible with our system.

## 2024-09-22 NOTE — PROGRESS NOTE ADULT - SUBJECTIVE AND OBJECTIVE BOX
SUBJECTIVE/OVERNIGHT EVENTS  Today is hospital day 5d. This morning patient was seen and examined at bedside, resting comfortably in bed. No acute or major events overnight.    MEDICATIONS  STANDING MEDICATIONS  amLODIPine   Tablet 5 milliGRAM(s) Oral daily  azithromycin   Tablet 500 milliGRAM(s) Oral daily  cefTRIAXone   IVPB 2000 milliGRAM(s) IV Intermittent every 12 hours  dextrose 5%. 1000 milliLiter(s) IV Continuous <Continuous>  dextrose 5%. 1000 milliLiter(s) IV Continuous <Continuous>  dextrose 50% Injectable 25 Gram(s) IV Push once  dextrose 50% Injectable 12.5 Gram(s) IV Push once  dextrose 50% Injectable 25 Gram(s) IV Push once  enoxaparin Injectable 40 milliGRAM(s) SubCutaneous every 24 hours  glucagon  Injectable 1 milliGRAM(s) IntraMuscular once  insulin lispro (ADMELOG) corrective regimen sliding scale   SubCutaneous three times a day before meals  lactated ringers. 1000 milliLiter(s) IV Continuous <Continuous>    PRN MEDICATIONS  acetaminophen     Tablet .. 650 milliGRAM(s) Oral every 6 hours PRN  dextrose Oral Gel 15 Gram(s) Oral once PRN  ondansetron Injectable 4 milliGRAM(s) IV Push every 6 hours PRN    VITALS  T(F): 98.6 (09-22-24 @ 05:28), Max: 99.9 (09-21-24 @ 20:10)  HR: 87 (09-22-24 @ 05:28) (78 - 87)  BP: 134/66 (09-22-24 @ 05:28) (108/66 - 134/66)  RR: 18 (09-22-24 @ 05:28) (18 - 18)  SpO2: 98% (09-22-24 @ 05:28) (96% - 98%)  POCT Blood Glucose.: 112 mg/dL (09-22-24 @ 07:43)  POCT Blood Glucose.: 145 mg/dL (09-21-24 @ 21:01)  POCT Blood Glucose.: 193 mg/dL (09-21-24 @ 16:26)  POCT Blood Glucose.: 111 mg/dL (09-21-24 @ 11:07)  POCT Blood Glucose.: 104 mg/dL (09-21-24 @ 07:51)    PHYSICAL EXAM  GENERAL: NAD, lying in bed comfortably  HEAD:  Atraumatic, normocephalic  EYES: EOMI, PERRLA, conjunctiva and sclera clear  ENT: Moist mucous membranes  NECK: Supple, no JVD  HEART: Regular rate and rhythm, no murmurs, rubs, or gallops  LUNGS: Unlabored respirations.  Clear to auscultation bilaterally, no crackles, wheezing, or rhonchi  ABDOMEN: Soft, nontender, nondistended, +BS  EXTREMITIES: 2+ peripheral pulses bilaterally. No clubbing, cyanosis, or edema  NERVOUS SYSTEM:  A&Ox3, no focal deficits   SKIN: No rashes or lesions    LABS             12.3   3.90  )-----------( 177      ( 09-21-24 @ 07:21 )             36.0     141  |  101  |  9   -------------------------<  96   09-21-24 @ 07:21  3.8  |  29  |  0.6    Ca      8.1     09-21-24 @ 07:21  Mg     1.7     09-21-24 @ 07:21    TPro  5.2  /  Alb  3.1  /  TBili  0.3  /  DBili  x   /  AST  24  /  ALT  36  /  AlkPhos  88  /  GGT  x     09-21-24 @ 07:21        Urinalysis Basic - ( 21 Sep 2024 07:21 )    Color: x / Appearance: x / SG: x / pH: x  Gluc: 96 mg/dL / Ketone: x  / Bili: x / Urobili: x   Blood: x / Protein: x / Nitrite: x   Leuk Esterase: x / RBC: x / WBC x   Sq Epi: x / Non Sq Epi: x / Bacteria: x          Culture - Blood (collected 19 Sep 2024 15:10)  Source: .Blood Blood-Peripheral  Preliminary Report (21 Sep 2024 22:01):    No growth at 48 Hours    Culture - Blood (collected 19 Sep 2024 15:10)  Source: .Blood Blood-Peripheral  Preliminary Report (21 Sep 2024 22:01):    No growth at 48 Hours      IMAGING SUBJECTIVE/OVERNIGHT EVENTS  Today is hospital day 5d. This morning patient was seen and examined at bedside, resting comfortably in bed. No acute or major events overnight. Pt denies any new complaints.    MEDICATIONS  STANDING MEDICATIONS  amLODIPine   Tablet 5 milliGRAM(s) Oral daily  azithromycin   Tablet 500 milliGRAM(s) Oral daily  cefTRIAXone   IVPB 2000 milliGRAM(s) IV Intermittent every 12 hours  dextrose 5%. 1000 milliLiter(s) IV Continuous <Continuous>  dextrose 5%. 1000 milliLiter(s) IV Continuous <Continuous>  dextrose 50% Injectable 25 Gram(s) IV Push once  dextrose 50% Injectable 12.5 Gram(s) IV Push once  dextrose 50% Injectable 25 Gram(s) IV Push once  enoxaparin Injectable 40 milliGRAM(s) SubCutaneous every 24 hours  glucagon  Injectable 1 milliGRAM(s) IntraMuscular once  insulin lispro (ADMELOG) corrective regimen sliding scale   SubCutaneous three times a day before meals  lactated ringers. 1000 milliLiter(s) IV Continuous <Continuous>    PRN MEDICATIONS  acetaminophen     Tablet .. 650 milliGRAM(s) Oral every 6 hours PRN  dextrose Oral Gel 15 Gram(s) Oral once PRN  ondansetron Injectable 4 milliGRAM(s) IV Push every 6 hours PRN    VITALS  T(F): 98.6 (09-22-24 @ 05:28), Max: 99.9 (09-21-24 @ 20:10)  HR: 87 (09-22-24 @ 05:28) (78 - 87)  BP: 134/66 (09-22-24 @ 05:28) (108/66 - 134/66)  RR: 18 (09-22-24 @ 05:28) (18 - 18)  SpO2: 98% (09-22-24 @ 05:28) (96% - 98%)  POCT Blood Glucose.: 112 mg/dL (09-22-24 @ 07:43)  POCT Blood Glucose.: 145 mg/dL (09-21-24 @ 21:01)  POCT Blood Glucose.: 193 mg/dL (09-21-24 @ 16:26)  POCT Blood Glucose.: 111 mg/dL (09-21-24 @ 11:07)  POCT Blood Glucose.: 104 mg/dL (09-21-24 @ 07:51)    PHYSICAL EXAM  GENERAL: NAD, lying in bed comfortably, diaphoretic  HEAD:  Atraumatic, normocephalic  HEART: Regular rate and rhythm, no murmurs, rubs, or gallops  LUNGS: Unlabored respirations.  Clear to auscultation bilaterally, no crackles, wheezing, or rhonchi  ABDOMEN: Soft, nontender, nondistended, +BS  EXTREMITIES: No LE edema  NERVOUS SYSTEM:  A&Ox4, no focal deficits    LABS             12.3   3.90  )-----------( 177      ( 09-21-24 @ 07:21 )             36.0     141  |  101  |  9   -------------------------<  96   09-21-24 @ 07:21  3.8  |  29  |  0.6    Ca      8.1     09-21-24 @ 07:21  Mg     1.7     09-21-24 @ 07:21    TPro  5.2  /  Alb  3.1  /  TBili  0.3  /  DBili  x   /  AST  24  /  ALT  36  /  AlkPhos  88  /  GGT  x     09-21-24 @ 07:21        Urinalysis Basic - ( 21 Sep 2024 07:21 )    Color: x / Appearance: x / SG: x / pH: x  Gluc: 96 mg/dL / Ketone: x  / Bili: x / Urobili: x   Blood: x / Protein: x / Nitrite: x   Leuk Esterase: x / RBC: x / WBC x   Sq Epi: x / Non Sq Epi: x / Bacteria: x          Culture - Blood (collected 19 Sep 2024 15:10)  Source: .Blood Blood-Peripheral  Preliminary Report (21 Sep 2024 22:01):    No growth at 48 Hours    Culture - Blood (collected 19 Sep 2024 15:10)  Source: .Blood Blood-Peripheral  Preliminary Report (21 Sep 2024 22:01):    No growth at 48 Hours

## 2024-09-24 LAB
-  AZITHROMYCIN: SIGNIFICANT CHANGE UP
CULTURE RESULTS: ABNORMAL
CULTURE RESULTS: SIGNIFICANT CHANGE UP
CULTURE RESULTS: SIGNIFICANT CHANGE UP
METHOD TYPE: SIGNIFICANT CHANGE UP
NOROVIRUS GI+II RNA STL QL NAA+NON-PROBE: SIGNIFICANT CHANGE UP
NOROVIRUS GI+II RNA STL QL NAA+NON-PROBE: SIGNIFICANT CHANGE UP
ORGANISM # SPEC MICROSCOPIC CNT: ABNORMAL
ORGANISM # SPEC MICROSCOPIC CNT: ABNORMAL
ORGANISM # SPEC MICROSCOPIC CNT: SIGNIFICANT CHANGE UP
SPECIMEN SOURCE: SIGNIFICANT CHANGE UP

## 2024-09-26 DIAGNOSIS — Z79.899 OTHER LONG TERM (CURRENT) DRUG THERAPY: ICD-10-CM

## 2024-09-26 DIAGNOSIS — I10 ESSENTIAL (PRIMARY) HYPERTENSION: ICD-10-CM

## 2024-09-26 DIAGNOSIS — A41.9 SEPSIS, UNSPECIFIED ORGANISM: ICD-10-CM

## 2024-09-26 DIAGNOSIS — A04.2 ENTEROINVASIVE ESCHERICHIA COLI INFECTION: ICD-10-CM

## 2024-09-26 DIAGNOSIS — A03.8 OTHER SHIGELLOSIS: ICD-10-CM

## 2024-09-26 DIAGNOSIS — Z79.82 LONG TERM (CURRENT) USE OF ASPIRIN: ICD-10-CM

## 2024-09-26 DIAGNOSIS — E87.6 HYPOKALEMIA: ICD-10-CM

## 2024-09-26 DIAGNOSIS — E66.9 OBESITY, UNSPECIFIED: ICD-10-CM

## 2024-09-26 DIAGNOSIS — A04.8 OTHER SPECIFIED BACTERIAL INTESTINAL INFECTIONS: ICD-10-CM

## 2024-09-26 DIAGNOSIS — E83.42 HYPOMAGNESEMIA: ICD-10-CM

## 2024-09-26 DIAGNOSIS — R73.03 PREDIABETES: ICD-10-CM

## 2024-10-29 ENCOUNTER — APPOINTMENT (OUTPATIENT)
Dept: INTERNAL MEDICINE | Facility: CLINIC | Age: 59
End: 2024-10-29